# Patient Record
Sex: FEMALE | Race: WHITE | NOT HISPANIC OR LATINO | Employment: FULL TIME | URBAN - METROPOLITAN AREA
[De-identification: names, ages, dates, MRNs, and addresses within clinical notes are randomized per-mention and may not be internally consistent; named-entity substitution may affect disease eponyms.]

---

## 2017-02-13 ENCOUNTER — OFFICE VISIT - HEALTHEAST (OUTPATIENT)
Dept: FAMILY MEDICINE | Facility: CLINIC | Age: 22
End: 2017-02-13

## 2017-02-13 DIAGNOSIS — Z11.3 SCREEN FOR STD (SEXUALLY TRANSMITTED DISEASE): ICD-10-CM

## 2017-02-13 DIAGNOSIS — F41.9 ANXIETY: ICD-10-CM

## 2017-02-15 ENCOUNTER — AMBULATORY - HEALTHEAST (OUTPATIENT)
Dept: FAMILY MEDICINE | Facility: CLINIC | Age: 22
End: 2017-02-15

## 2017-02-23 ENCOUNTER — COMMUNICATION - HEALTHEAST (OUTPATIENT)
Dept: FAMILY MEDICINE | Facility: CLINIC | Age: 22
End: 2017-02-23

## 2017-09-04 ENCOUNTER — COMMUNICATION - HEALTHEAST (OUTPATIENT)
Dept: FAMILY MEDICINE | Facility: CLINIC | Age: 22
End: 2017-09-04

## 2017-09-06 ENCOUNTER — AMBULATORY - HEALTHEAST (OUTPATIENT)
Dept: FAMILY MEDICINE | Facility: CLINIC | Age: 22
End: 2017-09-06

## 2017-11-26 ENCOUNTER — HEALTH MAINTENANCE LETTER (OUTPATIENT)
Age: 22
End: 2017-11-26

## 2017-12-29 ENCOUNTER — RECORDS - HEALTHEAST (OUTPATIENT)
Dept: ADMINISTRATIVE | Facility: OTHER | Age: 22
End: 2017-12-29

## 2018-02-05 ENCOUNTER — OFFICE VISIT - HEALTHEAST (OUTPATIENT)
Dept: FAMILY MEDICINE | Facility: CLINIC | Age: 23
End: 2018-02-05

## 2018-02-05 DIAGNOSIS — Z00.00 ROUTINE HEALTH MAINTENANCE: ICD-10-CM

## 2018-02-05 DIAGNOSIS — F32.9 MAJOR DEPRESSIVE DISORDER WITH CURRENT ACTIVE EPISODE: ICD-10-CM

## 2018-02-05 DIAGNOSIS — Z86.19 HISTORY OF CHLAMYDIA: ICD-10-CM

## 2018-02-06 LAB
C TRACH DNA SPEC QL PROBE+SIG AMP: NEGATIVE
N GONORRHOEA DNA SPEC QL NAA+PROBE: NEGATIVE

## 2018-02-07 LAB

## 2018-03-12 ENCOUNTER — OFFICE VISIT - HEALTHEAST (OUTPATIENT)
Dept: FAMILY MEDICINE | Facility: CLINIC | Age: 23
End: 2018-03-12

## 2018-03-12 DIAGNOSIS — F32.9 MAJOR DEPRESSIVE DISORDER WITH CURRENT ACTIVE EPISODE: ICD-10-CM

## 2018-03-12 DIAGNOSIS — Z30.09 BIRTH CONTROL COUNSELING: ICD-10-CM

## 2018-03-12 LAB — HCG UR QL: POSITIVE

## 2018-03-13 ENCOUNTER — AMBULATORY - HEALTHEAST (OUTPATIENT)
Dept: FAMILY MEDICINE | Facility: CLINIC | Age: 23
End: 2018-03-13

## 2018-03-13 ENCOUNTER — OFFICE VISIT - HEALTHEAST (OUTPATIENT)
Dept: FAMILY MEDICINE | Facility: CLINIC | Age: 23
End: 2018-03-13

## 2018-03-13 DIAGNOSIS — Z34.90 PREGNANCY: ICD-10-CM

## 2018-03-13 DIAGNOSIS — O09.299 TRISOMY 18 IN CHILD OF PRIOR PREGNANCY, CURRENTLY PREGNANT: ICD-10-CM

## 2018-03-13 DIAGNOSIS — N91.2 AMENORRHEA: ICD-10-CM

## 2018-03-13 LAB
BASOPHILS # BLD AUTO: 0 THOU/UL (ref 0–0.2)
BASOPHILS NFR BLD AUTO: 1 % (ref 0–2)
EOSINOPHIL # BLD AUTO: 0.1 THOU/UL (ref 0–0.4)
EOSINOPHIL NFR BLD AUTO: 1 % (ref 0–6)
ERYTHROCYTE [DISTWIDTH] IN BLOOD BY AUTOMATED COUNT: 13 % (ref 11–14.5)
HCG SERPL-ACNC: 158 MLU/ML (ref 0–4)
HCT VFR BLD AUTO: 39.6 % (ref 35–47)
HGB BLD-MCNC: 13.6 G/DL (ref 12–16)
HIV 1+2 AB+HIV1 P24 AG SERPL QL IA: NEGATIVE
LYMPHOCYTES # BLD AUTO: 2.3 THOU/UL (ref 0.8–4.4)
LYMPHOCYTES NFR BLD AUTO: 36 % (ref 20–40)
MCH RBC QN AUTO: 32.3 PG (ref 27–34)
MCHC RBC AUTO-ENTMCNC: 34.3 G/DL (ref 32–36)
MCV RBC AUTO: 94 FL (ref 80–100)
MONOCYTES # BLD AUTO: 0.5 THOU/UL (ref 0–0.9)
MONOCYTES NFR BLD AUTO: 8 % (ref 2–10)
NEUTROPHILS # BLD AUTO: 3.4 THOU/UL (ref 2–7.7)
NEUTROPHILS NFR BLD AUTO: 54 % (ref 50–70)
PLATELET # BLD AUTO: 187 THOU/UL (ref 140–440)
PMV BLD AUTO: 9.8 FL (ref 8.5–12.5)
RBC # BLD AUTO: 4.21 MILL/UL (ref 3.8–5.4)
WBC: 6.3 THOU/UL (ref 4–11)

## 2018-03-14 ENCOUNTER — COMMUNICATION - HEALTHEAST (OUTPATIENT)
Dept: FAMILY MEDICINE | Facility: CLINIC | Age: 23
End: 2018-03-14

## 2018-03-14 LAB
ABO/RH(D): NORMAL
ABORH REPEAT: NORMAL
ANTIBODY SCREEN: NEGATIVE
HBV SURFACE AG SERPL QL IA: NEGATIVE
RUBV IGG SERPL QL IA: POSITIVE
T PALLIDUM AB SER QL: NEGATIVE

## 2018-03-28 ENCOUNTER — COMMUNICATION - HEALTHEAST (OUTPATIENT)
Dept: FAMILY MEDICINE | Facility: CLINIC | Age: 23
End: 2018-03-28

## 2018-03-28 DIAGNOSIS — Z34.90 PREGNANCY: ICD-10-CM

## 2018-03-29 ENCOUNTER — AMBULATORY - HEALTHEAST (OUTPATIENT)
Dept: FAMILY MEDICINE | Facility: CLINIC | Age: 23
End: 2018-03-29

## 2018-03-29 DIAGNOSIS — R53.83 LOW ENERGY: ICD-10-CM

## 2018-03-29 DIAGNOSIS — R53.83 FATIGUE: ICD-10-CM

## 2018-04-04 ENCOUNTER — COMMUNICATION - HEALTHEAST (OUTPATIENT)
Dept: FAMILY MEDICINE | Facility: CLINIC | Age: 23
End: 2018-04-04

## 2018-04-04 ENCOUNTER — HOSPITAL ENCOUNTER (OUTPATIENT)
Dept: ULTRASOUND IMAGING | Facility: HOSPITAL | Age: 23
Discharge: HOME | End: 2018-04-04
Attending: FAMILY MEDICINE

## 2018-04-04 DIAGNOSIS — Z34.90 PREGNANCY: ICD-10-CM

## 2018-04-09 ENCOUNTER — MEDICAL CORRESPONDENCE (OUTPATIENT)
Dept: HEALTH INFORMATION MANAGEMENT | Facility: CLINIC | Age: 23
End: 2018-04-09

## 2018-04-17 ENCOUNTER — TRANSFERRED RECORDS (OUTPATIENT)
Dept: HEALTH INFORMATION MANAGEMENT | Facility: CLINIC | Age: 23
End: 2018-04-17

## 2018-04-25 DIAGNOSIS — O09.299: Primary | ICD-10-CM

## 2018-05-04 ENCOUNTER — PRE VISIT (OUTPATIENT)
Dept: MATERNAL FETAL MEDICINE | Facility: CLINIC | Age: 23
End: 2018-05-04

## 2018-05-10 ENCOUNTER — RECORDS - HEALTHEAST (OUTPATIENT)
Dept: ADMINISTRATIVE | Facility: OTHER | Age: 23
End: 2018-05-10

## 2018-05-11 ENCOUNTER — RECORDS - HEALTHEAST (OUTPATIENT)
Dept: ADMINISTRATIVE | Facility: OTHER | Age: 23
End: 2018-05-11

## 2018-05-11 ENCOUNTER — HOSPITAL ENCOUNTER (OUTPATIENT)
Dept: LAB | Facility: CLINIC | Age: 23
End: 2018-05-11
Attending: OBSTETRICS & GYNECOLOGY
Payer: MEDICAID

## 2018-05-11 ENCOUNTER — OFFICE VISIT (OUTPATIENT)
Dept: MATERNAL FETAL MEDICINE | Facility: CLINIC | Age: 23
End: 2018-05-11
Attending: OBSTETRICS & GYNECOLOGY
Payer: MEDICAID

## 2018-05-11 ENCOUNTER — OFFICE VISIT (OUTPATIENT)
Dept: MATERNAL FETAL MEDICINE | Facility: CLINIC | Age: 23
End: 2018-05-11
Attending: FAMILY MEDICINE
Payer: MEDICAID

## 2018-05-11 ENCOUNTER — HOSPITAL ENCOUNTER (OUTPATIENT)
Dept: ULTRASOUND IMAGING | Facility: CLINIC | Age: 23
Discharge: HOME | End: 2018-05-11
Attending: OBSTETRICS & GYNECOLOGY | Admitting: OBSTETRICS & GYNECOLOGY
Payer: MEDICAID

## 2018-05-11 DIAGNOSIS — O26.90 PREGNANCY RELATED CONDITION, ANTEPARTUM: ICD-10-CM

## 2018-05-11 DIAGNOSIS — O09.291 PREVIOUS PREGNANCY COMPLICATED BY CHROMOSOMAL ABNORMALITY, ANTEPARTUM, FIRST TRIMESTER: Primary | ICD-10-CM

## 2018-05-11 DIAGNOSIS — O09.291 PREVIOUS PREGNANCY COMPLICATED BY CHROMOSOMAL ABNORMALITY, ANTEPARTUM, FIRST TRIMESTER: ICD-10-CM

## 2018-05-11 DIAGNOSIS — O09.299 PREVIOUS PREGNANCY COMPLICATED BY CHROMOSOMAL ABNORMALITY, ANTEPARTUM: Primary | ICD-10-CM

## 2018-05-11 DIAGNOSIS — O09.299: ICD-10-CM

## 2018-05-11 PROCEDURE — 40000791 ZZHCL STATISTIC VERIFI PRENATAL TRISOMY 21,18,13: Performed by: GENETIC COUNSELOR, MS

## 2018-05-11 PROCEDURE — 36415 COLL VENOUS BLD VENIPUNCTURE: CPT | Performed by: GENETIC COUNSELOR, MS

## 2018-05-11 PROCEDURE — 76813 OB US NUCHAL MEAS 1 GEST: CPT

## 2018-05-11 PROCEDURE — 96040 ZZH GENETIC COUNSELING, EACH 30 MINUTES: CPT | Mod: ZF | Performed by: GENETIC COUNSELOR, MS

## 2018-05-11 NOTE — MR AVS SNAPSHOT
After Visit Summary   5/11/2018    Hannah Severino    MRN: [id number]           Patient Information     Date Of Birth          1995        Visit Information        Provider Department      5/11/2018 2:45 PM Carl Guzman MD [facility] Maternal Fetal Medicine [facility]        Today's Diagnoses     Previous pregnancy complicated by chromosomal abnormality, antepartum    -  1       Follow-ups after your visit        Your next 10 appointments already scheduled     Jun 22, 2018  1:30 PM CDT   M US COMP with [id number]   [facility] Fetal Medicine Ultrasound - [facility])    [address]  [facility] [address] [phone]           Wear comfortable clothes and leave your valuables at home.            Jun 22, 2018  2:00 PM CDT   Radiology MD with  URSULA SUNG   [facility] Maternal Fetal Medicine [facility] ([facility])    [address]  [facility] [address] [phone]           Please arrive at the time given for your first appointment. This visit is used internally to schedule the physician's time during your ultrasound.              Future tests that were ordered for you today     Open Future Orders        Priority Expected Expires Ordered    MFM US Comprehensive Single Routine 6/22/2018 3/11/2019 5/11/2018    Non Invasive Prenatal Test Cell Free DNA Routine  8/9/2018 5/11/2018    Maternal Fetal Nuchal Translucency Routine  2/25/2019 4/25/2018            Who to contact     If you have questions or need follow up information about today's clinic visit or your schedule please contact [facility] directly at [phone].  Normal or non-critical lab and imaging results will be communicated to you by MyChart, letter or phone within 4 business days after the clinic has received the results. If you do not hear from us within 7 days, please contact the clinic through MyChart or phone. If you  "have a critical or abnormal lab result, we will notify you by phone as soon as possible.  Submit refill requests through [employer] or call your pharmacy and they will forward the refill request to us. Please allow 3 business days for your refill to be completed.          Additional Information About Your Visit        [employer] Information     [employer] lets you send messages to your doctor, view your test results, renew your prescriptions, schedule appointments and more. To sign up, go to [email or web address] . Click on \"Log in\" on the left side of the screen, which will take you to the Welcome page. Then click on \"Sign up Now\" on the right side of the page.     You will be asked to enter the access code listed below, as well as some personal information. Please follow the directions to create your username and password.     Your access code is: [email or web address]  Expires: 2018  3:50 PM     Your access code will  in 90 days. If you need help or a new code, please call your [place] clinic or [phone].        Care EveryWhere ID     This is your Care EveryWhere ID. This could be used by other organizations to access your [place] medical records  [id number]        Your Vitals Were     Last Period                   2018            Blood Pressure from Last 3 Encounters:   16 (!) 82/60   16 101/74   16 95/65    Weight from Last 3 Encounters:   16 49.9 kg (110 lb)   16 54 kg (119 lb)   16 54.3 kg (119 lb 12.8 oz)               Primary Care Provider Office Phone # Fax #    Doreen MARADIAGA [place] [phone]       [facility] [address] [address]        Equal Access to Services     NGUYEN CLIFTON : Macario gallardoo Soely, waaxda luqadaha, qaybta kaalmada gabriella, jesus vines. So [facility] [phone].    ATENCIÓN: Si habla español, tiene a king disposición servicios gratuitos de asistencia " lingüística. Ayden al [phone].    We comply with applicable federal civil rights laws and [place] laws. We do not discriminate on the basis of race, color, national origin, age, disability, sex, sexual orientation, or gender identity.            Thank you!     Thank you for choosing [facility]  for your care. Our goal is always to provide you with excellent care. Hearing back from our patients is one way we can continue to improve our services. Please take a few minutes to complete the written survey that you may receive in the mail after your visit with us. Thank you!             Your Updated Medication List - Protect others around you: Learn how to safely use, store and throw away your medicines at www.disposemymeds.org.          This list is accurate as of 5/11/18  3:50 PM.  Always use your most recent med list.                   Brand Name Dispense Instructions for use Diagnosis    sertraline 20 MG/ML (HIGH CONC) solution    ZOLOFT    75 mL    Take 2.5 mLs (50 mg) by mouth daily    Adjustment disorder with mixed disturbance of emotions and conduct

## 2018-05-11 NOTE — PROGRESS NOTES
"Please see \"Imaging\" tab under \"Chart Review\" for details of today's visit.    Carl Guzman    "

## 2018-05-11 NOTE — MR AVS SNAPSHOT
After Visit Summary   5/11/2018    Hannah Severino    MRN: [id number]           Patient Information     Date Of Birth          1995        Visit Information        Provider Department      5/11/2018 1:30 PM Cassandra Woo, MIKA [facility] Maternal Fetal Medicine [facility]        Today's Diagnoses     Previous pregnancy complicated by chromosomal abnormality, antepartum, first trimester    -  1    Pregnancy related condition, antepartum           Follow-ups after your visit        Your next 10 appointments already scheduled     Jun 22, 2018  1:30 PM CDT   [facility] US COMP with [id number]   [facility] Maternal Fetal Medicine Ultrasound - [facility] ([facility])    [address]  [facility] [address] [phone]           Wear comfortable clothes and leave your valuables at home.            Jun 22, 2018  2:00 PM CDT   Radiology MD with  MFM MD   [facility] Maternal Fetal Medicine [facility] ([facility])    [address]  [facility] [address] [phone]           Please arrive at the time given for your first appointment. This visit is used internally to schedule the physician's time during your ultrasound.              Future tests that were ordered for you today     Open Future Orders        Priority Expected Expires Ordered    MFM US Comprehensive Single Routine 6/22/2018 3/11/2019 5/11/2018    Non Invasive Prenatal Test Cell Free DNA Routine  8/9/2018 5/11/2018    Maternal Fetal Nuchal Translucency Routine  2/25/2019 4/25/2018            Who to contact     If you have questions or need follow up information about today's clinic visit or your schedule please contact [facility] directly at [phone].  Normal or non-critical lab and imaging results will be communicated to you by MyChart, letter or phone within 4 business days after the clinic has received the results. If you do not hear from us within 7  "days, please contact the clinic through [employer] or phone. If you have a critical or abnormal lab result, we will notify you by phone as soon as possible.  Submit refill requests through [employer] or call your pharmacy and they will forward the refill request to us. Please allow 3 business days for your refill to be completed.          Additional Information About Your Visit        [employer] Information     [employer] lets you send messages to your doctor, view your test results, renew your prescriptions, schedule appointments and more. To sign up, go to [email or web address] . Click on \"Log in\" on the left side of the screen, which will take you to the Welcome page. Then click on \"Sign up Now\" on the right side of the page.     You will be asked to enter the access code listed below, as well as some personal information. Please follow the directions to create your username and password.     Your access code is: [email or web address]  Expires: 2018  3:50 PM     Your access code will  in 90 days. If you need help or a new code, please call your [place] clinic or [phone].        Care EveryWhere ID     This is your Care EveryWhere ID. This could be used by other organizations to access your [place] medical records  [id number]        Your Vitals Were     Last Period                   2018            Blood Pressure from Last 3 Encounters:   16 (!) 82/60   16 101/74   16 95/65    Weight from Last 3 Encounters:   16 49.9 kg (110 lb)   16 54 kg (119 lb)   16 54.3 kg (119 lb 12.8 oz)              We Performed the Following     [facility] Genetic Counseling        Primary Care Provider Office Phone # Fax #    Doreen Osborne [phone]       [facility] [address] [facility] [address]  [facility] [address]        Equal Access to Services     YANE CLIFTON AH: Macario gallardoo Somadayali, waaxda luqadaha, qaybta kaalmonse quiroz, jesus andrade " laparamjit vines. So [facility] [phone].    ATENCIÓN: Si habla edmundo, tiene a king disposición servicios gratuitos de asistencia lingüística. Ayden al [phone].    We comply with applicable federal civil rights laws and [place] laws. We do not discriminate on the basis of race, color, national origin, age, disability, sex, sexual orientation, or gender identity.            Thank you!     Thank you for choosing [facility]  for your care. Our goal is always to provide you with excellent care. Hearing back from our patients is one way we can continue to improve our services. Please take a few minutes to complete the written survey that you may receive in the mail after your visit with us. Thank you!             Your Updated Medication List - Protect others around you: Learn how to safely use, store and throw away your medicines at www.disposemymeds.org.          This list is accurate as of 5/11/18 11:59 PM.  Always use your most recent med list.                   Brand Name Dispense Instructions for use Diagnosis    sertraline 20 MG/ML (HIGH CONC) solution    ZOLOFT    75 mL    Take 2.5 mLs (50 mg) by mouth daily    Adjustment disorder with mixed disturbance of emotions and conduct

## 2018-05-12 NOTE — PROGRESS NOTES
"[facility] [place]  Genetic Counseling Consult    Patient: Hannah Severino YOB: 1995   Date of Service: May 10, 2018 Referring Provider:  Dr. Doreen Osborne     Hannah Severino was seen at [facility] [place] for genetic consultation to discuss the options for screening and testing for fetal chromosome abnormalities in this pregnancy because of her history of two previous pregnancies with Trisomy 18 (47,XX,+18).  She was accompanied to clinic today by a friend.       Summary/Plan:     1.  Hannah has had two previous pregnancies with Trisomy 18 (47,XX,+18): a baby that was stillborn at 38 weeks and a first trimester pregnancy loss.  We talked about that this history likely increases the risk for Trisomy 18 (and possibly other chromosome problems) in this pregnancy, but it is hard to be specific about an exact risk.  Regardless, we talked about that Hannah should be offered \"increased risk\" screening and testing options for fetal chromosome abnormalities in this pregnancy because of this history.  See below for more details of that discussion.    2.  After our discussion, Hannah decided to have an ultrasound for nuchal translucency and nasal bone assessment and her blood draw for an NIPT analysis (Innatal test through [facility]).  Results are expected within 7-10 days and will be available in [facility].  We will contact her to discuss the results, and a copy will be forwarded to the office of the referring prenatal care provider.    3. Maternal serum AFP only (single marker screen) is recommended to be considered after 15 weeks to screen for open neural tube defects. A quad screen is not recommended to be performed.    4. Hannah reports that she is not interested in invasive prenatal testing at this time.  An 18-20 week comprehensive ultrasound is generally considered standard of care for all women who are at increased risk for a fetal " "chromosome problem and who decline invasive testing for chromosome problems during pregnancy.  This has been scheduling in our clinic for 18.    Pregnancy History:   /Parity:   (1 stillbirth, 1 SAB) Age at Delivery: 23 year old  ALF: 2018, by ultrasound   Gestational Age: 12w3d    Hannah reports that she is not currently taking her prenatal vitamins because of pregnancy-related nausea.       Hannah also reports that she is currently not taking the sertraline that she usually takes for depression/anxiety because of concerns about how this might affect the baby.  She reports that she notices an increase in her mental health-related symptoms, particularly with mood swings, but she feels like she is \"okay\" not taking her medication.    We talked about the importance of good mental health during pregnancy and the post-partum time period.  We talked about that it is important for her to monitor her mental health symptoms and to contact her psychiatrist/health care providers if she experiences significant changes in her symptoms.  In particular, we talked about that it would be important for her to let her providers know if she is 1) having thoughts of harming herself or others, or 2) is unable to function/perform her routine daily activities, or 3) is having trouble sleeping or eating.        Hannah reports that she recently quit cigarette smoking.  She reports that she quit drinking alcohol at the time of identifying her pregnancy around 6 weeks past her LMP.        Per her prenatal record, Hannah ALF was determined by a 7 week ultrasound.  This ultrasound showed fetal growth consistent with her LMP date.      See family history section below for details of her previous pregnancies.  Hannah reports that this pregnancy was conceived with the same partner as her second pregnancy; however, her prenatal record notes that Hannah indicated that she was not sure about the father of this pregnancy.  Hannah " does report that her first pregnancy occurred with a different partner than this pregnancy.      Hannah talked a bit today about some of her emotions of this pregnancy, given the context of two previous pregnancies losses.  She talked about that she finds herself feeling cautious about the pregnancy and that this makes it challenging when others in her life are expressing more excitement about the pregnancy.  We talked about that these emotions and feelings are common to many women who have had similar experiences with previous pregnancy losses.       Family History:     A four-generation pedigree was obtained and will be scanned under the  Media  tab in EPIC. The following significant findings were reported by Hannah:      Previous pregnancies with Trisomy 18:  Review of records available through [facility] note that with her first pregnancy in , Hannah presented to [facility] for a first trimester screen.  Ultrasound assessment at that time identified a cystic hygroma; therefore, she chose to have an NIPT analysis drawn.  This NIPT analysis was screen positive for Trisomy 18.  A second trimester ultrasound showed a fetal heart defect, but Hannah declined invasive prenatal testing.  Subsequent ultrasounds showed additional findings consistent with Trisomy 18 (besides the complex heart defect), including a strawberry-shaped calvarium, absent CSP, right choroid plexus cyst, abnormal hand posturing, rocker-bottom feet, and intrauterine growth retardation.      Hannah decided to have an amniocentesis at 26 weeks, which confirmed a diagnosis of Trisomy 18 caused by a non-disjunction event:  47,XX,+18.  Hannah originally considered an early induction of labor, but ultimately decided to continue the pregnancy.  Hannah reports that she had an intrauterine fetal demise diagnosed at 37 and a half weeks and subsequently delivered her daughter at [facility].  She reports that she named her  "daughter Eduardo Young reports that her second pregnancy was with a different partner than her first pregnancy.  With that pregnancy, records indicate that she had a normal 7 week ultrasound.  However, an ultrasound done at about 9 weeks showed no fetal heart rate, and the fetus measured around 8 week size.  Hannah had a D&C at that time, and her provider requested some evaluations for \"recurrent pregnancy loss.\"  Records in [facility] showed that this testing included a normal blood chromosome analysis for Hannah (46,XX), a negative lupus anticoagulant test, a negative cardiolipin antibody screen (IgM and IgG), and a negative beta-2-glycoprotein antibody screen (IgM and IgG).  A chromosome analysis was also requested from the products of conception and showed Trisomy 18 caused by a non-disjunction event: 47,XX,+18.    We talked about that this history of recurrent pregnancies with Trisomy 18 in a young mother is unusual.  While having a previous pregnancy with a non-disjunction related chromosome problem is thought to somewhat raise the risk of a non-disjunction related chromosome problem in a future pregnancy, the overall risk is usually still low.  However, we talked about that having two pregnancies with Trisomy 18 at a young age raises the suspicion for a more significantly increased risk for fetal chromosome anomalities for Hannah's pregnancies.    We talked about that there could be variety of potential explanations for a young woman to have two consecutive pregnancies with Trisomy 18, and that we can't sure of what the explanation is for her:    1) We talked about that one potential explanation is just random \"bad luck.\"      2) Additionally, there are some reports of in the scientific literature of some women or families that seem to be \"predisposed\" to have pregnancies with non-disjunction related chromosome problems.  The reasons for this are not often known, but it is thought that these " "families/individuals might have variations in proteins involved in chromosome separation during meiosis.    3) We also talked about that there have been reports of women who have recurrent pregnancies with Trisomy 18 because they, themselves, are mosaic for Trisomy 18 (meaning that a proportion of their body cells and/or germ cells have three chromosome 18's).  Hannah did have a previous normal chromosome analysis, but we talked about that a normal blood chromosome analysis would not rule out the possibility of mosaicism in other cells in her body.  (Of note, her blood chromosome analysis only analyzed 20 cells, and therefore, does also not rule out mosaicism in her blood, either.)    We talked about that because we don't know the exact explanation for her two previous pregnancies with non-disjunction related Trisomy 18, it is hard to be specific about exact risks for a fetal chromosome problem in this pregnancy.  However, we talked about that this history likely significantly increases the risk of Trisomy 18 (and possibly other chromosome problems) in this pregnancy.  Because of this, we talked about that it makes sense to offer Hannah the screening and testing options for fetal chromosome problems generally offered to women of \"increased risk\" to have a pregnancy with a chromosome problem.      Family history of infant death:  Hannah reports that her father had a brother that  at around age 1 from \"swelling in the brain.\"  She didn't know a lot of details about this or the reason for this brain swelling.  It is hard to be specific about risks related to this history without more details about this diagnosis.  She was not aware of a specific genetic condition in this individual, nor did she know of any other family history of similarly affected individuals.      Family history of other stillbirth:  Hannah reports that the father of this baby had a previous pregnancy with another partner that ended in a " "stillbirth at 25 weeks.  She reports that she doesn't know any other detail about this loss, because he \"doesn't like to talk about this.\"  It is hard to be specific about risks related to this history without more details.  She does report that her current partner also has two other ongoing pregnancies with two other partners currently.    Otherwise, the reported family history is negative for multiple miscarriages, intellectual disabilities, and consanguinity.       Carrier Screening:       Hannah reports that she is of  ancestry.  We briefly talked about cystic fibrosis is an autosomal recessive genetic condition that occurs with increased frequency in individuals of  ancestry and carrier screening for this condition is available.  In addition,  screening in the [facility] includes cystic fibrosis.      Hannah reports that the father of this pregnancy is .  We briefly talked about the hemoglobinopathies are a group of genetic blood diseases that occur with increased frequency in individuals of  ancestry and carrier screening for these conditions is available.  Carrier screening for the hemoglobinopathies includes a CBC with red blood cell indices, a ferritin level, and a quantitative hemoglobin electrophoresis or HPLC.  In addition,  screening in the [facility] includes many of the hemoglobinopathies.    In addition to ethnic-based carrier screening, we talked about that expanded carrier screening for mutations in a large panel of genes associated with autosomal recessive conditions (including cystic fibrosis, spinal muscular atrophy, and others) and X-linked recessive conditions (like Fragile X syndrome) is now available      If  Hannah decides that she would like to have cystic fibrosis or other carrier screening in the future, I would be happy to help facilitate this, if needed.       Testing Options:       We reviewed the benefits and " limitations of screening and diagnostic tests:    - We talked about that screening tests provide a risk assessment specific to the pregnancy for certain fetal chromosome abnormalities, but cannot definitively diagnose or exclude a fetal chromosome abnormality. Follow-up genetic counseling and consideration of diagnostic testing is recommended with any abnormal screening result.     - We discussed that dagnostic tests are associated with a risk of miscarriage. These tests can detect fetal chromosome abnormalities with greater than 99% certainty. Results can rarely be complicated by maternal cell contamination or mosaicism and are limited by the resolution of cytogenetic G-banding technology.     -There is no screening nor diagnostic test that can detect all forms of birth defects or mental disability.      We discussed the following screening and testing options:     Non-invasive Prenatal Testing (NIPT)  - This test involves measurement of cell-free DNA testing, which is of both maternal and placental/fetal origin.  - First trimester ultrasound with nuchal translucency and nasal bone assessment is recommended to be performed along with NIPT, when appropriate.  - This test screens for fetal trisomy 21, trisomy 13, trisomy 18, and sex chromosome aneuploidy.  - While this test is thought to be highly specific/sensitive with respect to screening for trisomy 21, trisomy 13, and trisomy 18, rare false positives and false negatives do occur. There is still limited data about the exact sensitivity/specificity of this test with respect to screening for sex chromosome aneuploidy.  - Insurance coverage for this test is variable, and so patients are encouraged to contact their insurance companies if there are questions about coverage for this test.  -  This test cannot screen for open neural tube defects, and so consideration of maternal serum AFP 15 weeks or later is recommended.     Chorionic villus sampling (CVS)  - This  invasive procedure is typically performed between 10 and 13 weeks of pregnancy, through which placental villi are obtained for the purpose of chromosome analysis and/or other prenatal genetic analysis.  - CVS is considered a diagnostic test for chromosome problems during pregnancy. Maternal cell contamination studies are performed, when indicated.  -The risk of pregnancy loss associated with a CVS procedure is generally estimated to be 1/200 or less.  -This test cannot screen for open neural tube defects, and so consideration of maternal serum AFP 15 weeks or later is recommended.     Genetic Amniocentesis  - This is an invasive procedure typically performed at 15 weeks or later, through which amniotic fluid is obtained for the purpose of chromosome analysis and/or other prenatal genetic analysis.  - Amniocentesis is considered a diagnostic test for chromosome problems during pregnancy.  - The risk of pregnancy loss associated with amniocentesis is generally estimated to be 1/500 or less.  - Amniotic fluid AFP measurement is also done to screen for the possibility of open neural tube or ventral defects.     Comprehensive (Level II) ultrasound  - This detailed ultrasound is usually performed between 18-22 weeks gestation to screen for major birth defects.  - It also screens for ultrasound markers that might increase the risk for a chromosome problem in a pregnancy.     Face-to-face time of the genetic counseling appointment was 35 minutes.    Jody Woo MS, [facility]  Genetic Counselor  Ph: [phone]  Pager: [phone]

## 2018-05-17 ENCOUNTER — TELEPHONE (OUTPATIENT)
Dept: MATERNAL FETAL MEDICINE | Facility: CLINIC | Age: 23
End: 2018-05-17

## 2018-05-17 ENCOUNTER — RECORDS - HEALTHEAST (OUTPATIENT)
Dept: ADMINISTRATIVE | Facility: OTHER | Age: 23
End: 2018-05-17

## 2018-05-17 NOTE — TELEPHONE ENCOUNTER
"I had seen Hannah for genetic counseling last week regarding her history of two previous pregnancies with Trisomy 18 (47,XX,+18).  At that time, I had reviewed with Hannah that one of the possible explanations for recurrent pregnancies with the same non-disjunction event in a young woman is either somatic or germline mosaicism in that woman.  Hannah had a normal ultrasound at that time and also had her blood drawn for NIPT analysis at that time.  (Please see previous genetic counseling notes for more details.)    Unfortunately, I received back Hannah's NIPT results today, which are SCREEN POSITIVE for Trisomy 18.  I called Hannah this afternoon and talked with her about these results.  We talked about that given these NIPT results and her previous history, I would expect the risk of Trisomy 18 in this pregnancy to be significantly increased; however, this risk would still not be 100%.     In particular, we talked about that if Hannah has somatic mosaicism for Trisomy 18 herself, it is possible that this may interfere with this NIPT testing technology and lead to a positive result, even if the fetus is not affected with Trisomy 18. (This is because the NIPT analysis involves quantification of all the cell-free DNA in the maternal blood stream, some of which is maternal in origin and some of which is placental in origin.)  Hannah talked about again that her previous blood testing in herself was normal (46,XX), and we reviewed again about that her blood test only looked at 20 cells in her blood, and therefore, does NOT rule-out the possibility of mosaicism for Trisomy 18 in her body (either somatic or germline).      We talked about that because this possibility of somatic mosaicism in Hannah could lead to \"false positive\" results, it would be important for Hannah to have invasive prenatal testing to confirm a fetal diagnosis of Trisomy 18 prior to making decisions about continuing or ending the pregnancy.  We reviewed " "again the options of CVS and amniocentesis.  We talked about that I was not sure if CVS would be an option for her, since she is already 13 weeks 2 days, and there are only a few physicians in the state that perform CVS during the 13th week.  I asked Hannah if she wanted me to call those offices to see if it would be possible to get her in for a CVS tomorrow or Monday, but she declines this at this time.  Hannah reports being familiar with amniocentesis from her first pregnancy, and she reported again feeling anxious about amniocentesis because she recalls her previous amniocentesis to be very painful.  We talked about that she could also consider continuing the pregnancy and getting more information about the baby via ultrasound, in order to see if there are any additional concerns for Trisomy 18 on those findings, in case that would aid in her decision-making.    Hannah did say today that she \"didn't think she could go through another pregnancy with Trisomy 18,\" and so she states that she would consider pregnancy termination.  I again encouraged her to have an invasive prenatal testing to confirm a diagnosis of Trisomy 18 in this pregnancy prior to having a termination.  Hannah stated that right now she wasn't sure what she wanted to do next.  She stated that she wanted to talk first some more with her primary prenatal care provider (Dr. Osborne) before making choices about next steps in her testing plan.  I told Hannah I would call Dr. Osborne to tell her about these results and our conversations.    Hannah was understandably disappointed about these test results.  She told me that she is concerned that she won't ever be able to have a normal pregnancy.  She asked if repeating the NIPT analysis would be helpful, and I stated I did not think that would provide additional useful information at that time.  We did talk about that she could consider a repeat blood chromosome analysis looking at larger numbers of " cells in her blood to better evaluate for somatic mosaicism; however, we talked about that a normal result still wouldn't rule out somatic mosaicism, and while a positive result might confirm mosaicism, it still would be difficult to provide specific recurrence risk information.    Hannah confirmed that she still has my contact information and was going to re-contact me if she decides she wants additional testing and wants help coordinating this.  I encouraged her to contact me if she thinks of additional questions.    Jody Woo MS, [facility]  Genetic Counselor  Ph: [phone]  Pager: [phone]    Addendum:  I did reach Dr. Osborne over the phone to discuss with her these test results.  A copy of this note and results will be faxed to her. Dr. Osborne noted an error in Hannah's ultrasound report that stated that Hannah had a previous normal NIPT analysis; I will have the M correct this report.

## 2018-05-18 ENCOUNTER — DOCUMENTATION ONLY (OUTPATIENT)
Dept: MATERNAL FETAL MEDICINE | Facility: CLINIC | Age: 23
End: 2018-05-18

## 2018-05-18 LAB — LAB SCANNED RESULT: ABNORMAL

## 2018-05-18 NOTE — PROGRESS NOTES
Yesterday, it was noted that Hannah's original NIPT report listed an incorrect birth date of [birth year].  Prior to calling Hannah with her results, I did contact [facility] and asked them to double check the identification for this sample/report, and they reported that the other identifiers available matched.  (It appears that someone at [facility] had transposed the draw date with her birth year.)  [facility] was going to update and send out a revised report to our clinic and the laboratory send outs.    I received the revised report, and this updated report was faxed to Hannah's primary prenatal care provider's office.    Jody Woo MS, [facility]  Genetic Counselor  Ph: [phone]  Pager: [phone]

## 2018-05-22 ENCOUNTER — TELEPHONE (OUTPATIENT)
Dept: MATERNAL FETAL MEDICINE | Facility: CLINIC | Age: 23
End: 2018-05-22

## 2018-05-22 NOTE — TELEPHONE ENCOUNTER
I received a voicemail message from Dr. Osborne (Hannah's primary prenatal care provider) stating that she had spoke with Hannah and that Hannah has decided that she wants to schedule invasive prenatal testing, in order to diagnostic information about the possibility of a chromosome problem in this pregnancy.    I called and left a voicemail for Hannah this morning, asking her to contact me, so I could help facilitate this.    Jody Woo MS, [facility]  Genetic Counselor  Ph: [phone]  Pager: [phone]

## 2018-05-25 ENCOUNTER — TELEPHONE (OUTPATIENT)
Dept: MATERNAL FETAL MEDICINE | Facility: CLINIC | Age: 23
End: 2018-05-25

## 2018-05-25 DIAGNOSIS — O28.0 ABNORMAL MATERNAL SERUM SCREENING TEST: Primary | ICD-10-CM

## 2018-05-25 NOTE — TELEPHONE ENCOUNTER
Hannah left me a voicemail yesterday afternoon, in response to my voicemail to her earlier in the week.  I tried her back, but I got her voicemail again, and so I left her another message to call me.    Jody Woo MS, [facility]  Genetic Counselor  Ph: [phone]  Pager: [phone]

## 2018-05-25 NOTE — TELEPHONE ENCOUNTER
"Hannah returned my earlier voicemail message.  She reports that she is interested in having an amniocentesis done, as she understands that it would be important to have diagnostic information about a potential chromosome problem in this pregnancy before proceeding with a pregnancy termination.  Hannah reports feeling anxious about an amniocentesis, because she recalls the amniocentesis experience with her first pregnancy as being very unpleasant.  Hannah previously reported that she found the procedure to be painful; today, she talked about that she felt like the physician who did her amniocentesis was \"cold and unfeeling\" and \"just told her that the baby was going to die\" without empathy or further discussion.    We talked about our general process for amniocentesis here.  We talked about that she would first meet with a genetic counselor to review consent forms for the procedure and testing.  We talked about that she would then have an ultrasound and that the maternal-fetal medicine physician would meet with her to discuss the ultrasound first, and then if Hannah felt comfortable with the situation, she could then proceed with an amniocentesis.  Hannah stated that she felt like this would be an acceptable plan for her.  We talked about that our physicians will attempt amniocentesis as early as 15 weeks but that sometimes at 15 weeks the amnion and chorion membranes are noted on ultrasound to not be fused, and then the amniocentesis can't be done.  We talked about that for most pregnancies, the amnion and chorion membranes are fused by 16 weeks.    Hannah stated that she \"wasn't in a hurry\" and would prefer to have her amniocentesis scheduled at 16 weeks given the above information.  She also prefers the [facility] location.  Therefore, she was scheduled for her amniocentesis on Friday, June 8, 2018.  Hannah reports no additional questions at this time.    Jody Woo MS, [facility]  Genetic Counselor  Ph: " [phone]  Pager: [phone]    Cc: Dr. Doreen Osborne  Fax:  [phone]

## 2018-05-31 ENCOUNTER — COMMUNICATION - HEALTHEAST (OUTPATIENT)
Dept: FAMILY MEDICINE | Facility: CLINIC | Age: 23
End: 2018-05-31

## 2018-06-08 ENCOUNTER — OFFICE VISIT (OUTPATIENT)
Dept: MATERNAL FETAL MEDICINE | Facility: CLINIC | Age: 23
End: 2018-06-08
Attending: OBSTETRICS & GYNECOLOGY
Payer: MEDICAID

## 2018-06-08 ENCOUNTER — RECORDS - HEALTHEAST (OUTPATIENT)
Dept: ADMINISTRATIVE | Facility: OTHER | Age: 23
End: 2018-06-08

## 2018-06-08 ENCOUNTER — HOSPITAL ENCOUNTER (OUTPATIENT)
Dept: ULTRASOUND IMAGING | Facility: CLINIC | Age: 23
Discharge: HOME | End: 2018-06-08
Attending: OBSTETRICS & GYNECOLOGY | Admitting: OBSTETRICS & GYNECOLOGY
Payer: MEDICAID

## 2018-06-08 DIAGNOSIS — O35.2XX0 HEREDITARY FAMILIAL DISEASE AFFECTING MANAGEMENT OF MOTHER AND POSSIBLY AFFECTING FETUS, ANTEPARTUM, SINGLE OR UNSPECIFIED FETUS: ICD-10-CM

## 2018-06-08 DIAGNOSIS — O28.0 ABNORMAL MATERNAL SERUM SCREENING TEST: Primary | ICD-10-CM

## 2018-06-08 DIAGNOSIS — O35.9XX0 FETAL ABNORMALITY AFFECTING MANAGEMENT OF MOTHER, ANTEPARTUM, SINGLE OR UNSPECIFIED FETUS: Primary | ICD-10-CM

## 2018-06-08 DIAGNOSIS — O28.0 ABNORMAL MATERNAL SERUM SCREENING TEST: ICD-10-CM

## 2018-06-08 DIAGNOSIS — O09.292 PREVIOUS PREGNANCY COMPLICATED BY CHROMOSOMAL ABNORMALITY IN SECOND TRIMESTER, ANTEPARTUM: ICD-10-CM

## 2018-06-08 PROCEDURE — 76805 OB US >/= 14 WKS SNGL FETUS: CPT

## 2018-06-08 PROCEDURE — 96040 ZZH GENETIC COUNSELING, EACH 30 MINUTES: CPT | Mod: ZF | Performed by: GENETIC COUNSELOR, MS

## 2018-06-08 NOTE — MR AVS SNAPSHOT
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            Jun 22, 2018  2:00 PM CDT   Radiology MD with  URSULA SUNG   [facility] Medicine [facility] ([facility])    [address]  [facility] [address] [phone]           Please arrive at the time given for your first appointment. This visit is used internally to schedule the physician's time during your ultrasound.              Future tests that were ordered for you today     Open Future Orders        Priority Expected Expires Ordered    AFP Amniotic Reflex to Acetylchol STAT  9/6/2018 6/8/2018    CHROMOSOME AMNIOTIC FLUID With Professional Interpretation STAT  9/6/2018 6/8/2018    FISH ANEUSCREEN With Professional Interpretation STAT  9/6/2018 6/8/2018            Who to contact     If you have questions or need follow up information about today's clinic visit or your schedule please contact [facility] FETAL [facility] directly at [phone].  Normal or non-critical lab and imaging results will be communicated to you by MyChart, letter or  "phone within 4 business days after the clinic has received the results. If you do not hear from us within 7 days, please contact the clinic through Zazoo or phone. If you have a critical or abnormal lab result, we will notify you by phone as soon as possible.  Submit refill requests through Zazoo or call your pharmacy and they will forward the refill request to us. Please allow 3 business days for your refill to be completed.          Additional Information About Your Visit        Zazoo Information     [employer] lets you send messages to your doctor, view your test results, renew your prescriptions, schedule appointments and more. To sign up, go to [email or web address] . Click on \"Log in\" on the left side of the screen, which will take you to the Welcome page. Then click on \"Sign up Now\" on the right side of the page.     You will be asked to enter the access code listed below, as well as some personal information. Please follow the directions to create your username and password.     Your access code is: [email or web address]  Expires: 2018  3:50 PM     Your access code will  in 90 days. If you need help or a new code, please call your [place] clinic or [phone].        Care EveryWhere ID     This is your Care EveryWhere ID. This could be used by other organizations to access your [place] medical records  [id number]        Your Vitals Were     Last Period                   2018            Blood Pressure from Last 3 Encounters:   16 (!) 82/60   16 101/74   16 95/65    Weight from Last 3 Encounters:   16 49.9 kg (110 lb)   16 54 kg (119 lb)   16 54.3 kg (119 lb 12.8 oz)              We Performed the Following     OBTAIN AFFIRMATION OF INFORMED CONSENT        Primary Care Provider Office Phone # Fax #    Doreen Osborne [phone]       [facility] [address]  [facility] [address]        Equal Access to Services     " YANE CLIFTON : Hadii aad fabiola tristen Flores, wajenniferda luqadaha, qaybta kaalmada saundradavidalex, waxshyam naresh adamsrandyconchita cantu . So [facility] [phone].    ATENCIÓN: Si habla español, tiene a king disposición servicios gratuitos de asistencia lingüística. Llame al [phone].    We comply with applicable federal civil rights laws and [place] laws. We do not discriminate on the basis of race, color, national origin, age, disability, sex, sexual orientation, or gender identity.            Thank you!     Thank you for choosing [facility]  for your care. Our goal is always to provide you with excellent care. Hearing back from our patients is one way we can continue to improve our services. Please take a few minutes to complete the written survey that you may receive in the mail after your visit with us. Thank you!             Your Updated Medication List - Protect others around you: Learn how to safely use, store and throw away your medicines at www.disposemymeds.org.          This list is accurate as of 6/8/18  4:25 PM.  Always use your most recent med list.                   Brand Name Dispense Instructions for use Diagnosis    sertraline 20 MG/ML (HIGH CONC) solution    ZOLOFT    75 mL    Take 2.5 mLs (50 mg) by mouth daily    Adjustment disorder with mixed disturbance of emotions and conduct

## 2018-06-08 NOTE — PROGRESS NOTES
Please see full imaging report from ViewPoint program under imaging tab.    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 I reviewed the patient's screening results.   She had cell-free DNA screening showing an increased risk of trisomy 18.    I discussed the findings on today's ultrasound with the patient and her mother. She also had an extensive counseling session with our genetic counselor Jody as well.  . I reviewed the limitations of ultrasound. We discussed the availability of amniocentesis for the precise diagnosis of chromosomal abnormalities including the associated procedure-related risk of pregnancy loss of approximately 1/400. If the fetus has trisomy 18 she would not wish to continue the pregnancy. But she is conflicted today as the amnio looks normal. Her first pregnancy had a large cystic hygroma followed by IUGR, and rocker bottom feet and clenched hands as well as a cardiac abnormality.     The patient initially planned amniocentesis today but had some concerns about earlier amnio but balanced those with fears that if she waited too long for the amnio to be done than termination of pregnancy might no longer be an option. We reviewed termination of pregnancy including D and E and palliative induction, which she would prefer not to have. If she goes forward with D and E for confirmed fetal trisomy 18 at a later date, she is very much interested in fetal intracardiac injection to stop cardiac activity prior to proceeding with D and E and this would be an option for her.     Ultimately she decided that she will return for her comprehensive anatomic survey in 2 weeks and will make a decision to likely proceed with amnio at that time. We have also discussed arranging a skin biopsy for her (likely through genetics clinic)  to assess for unusual maternal mosaicism that was not identified on her peripheral blood karyotype.     Follow-up is scheduled here in 2 weeks as described above.     Return to primary provider for continued prenatal care.    Stevie Patel MD  Maternal Fetal Medicine

## 2018-06-08 NOTE — MR AVS SNAPSHOT
After Visit Summary   6/8/2018    Hannah Severino    MRN: [id number]           Patient Information     Date Of Birth          1995        Visit Information        Provider Department      6/8/2018 1:30 PM Cassandra Woo GC [facility] Maternal Fetal [facility]        Today's Diagnoses     Abnormal maternal serum screening test    -  1    Previous pregnancy complicated by chromosomal abnormality in second trimester, antepartum           Follow-ups after your visit        Your next 10 appointments already scheduled     Jun 22, 2018  1:30 PM CDT   MFM US COMP with MFMUSR1   [facility] Fetal Medicine Ultrasound - [facility] ([facility])    [address]  [facility] [address] [phone]           Wear comfortable clothes and leave your valuables at home.            Jun 22, 2018  2:00 PM CDT   [facility]   [facility] Fetal Medicine [facility])    [address]  [facility] [address] [phone]           Please arrive at the time given for your first appointment. This visit is used internally to schedule the physician's time during your ultrasound.              Who to contact     If you have questions or need follow up information about today's clinic visit or your schedule please contact [facility] MATERNAL FETAL [facility] directly at [phone].  Normal or non-critical lab and imaging results will be communicated to you by MyChart, letter or phone within 4 business days after the clinic has received the results. If you do not hear from us within 7 days, please contact the clinic through MyChart or phone. If you have a critical or abnormal lab result, we will notify you by phone as soon as possible.  Submit refill requests through MessageParty or call your pharmacy and they will forward the refill request to us. Please allow 3 business days for your refill to be completed.     "      Additional Information About Your Visit        MyChart Information     [employer] lets you send messages to your doctor, view your test results, renew your prescriptions, schedule appointments and more. To sign up, go to [email or web address] . Click on \"Log in\" on the left side of the screen, which will take you to the Welcome page. Then click on \"Sign up Now\" on the right side of the page.     You will be asked to enter the access code listed below, as well as some personal information. Please follow the directions to create your username and password.     Your access code is: [email or web address]  Expires: 2018  3:50 PM     Your access code will  in 90 days. If you need help or a new code, please call your [place] clinic or [phone].        Care EveryWhere ID     This is your Care EveryWhere ID. This could be used by other organizations to access your [place] medical records  [id number]        Your Vitals Were     Last Period                   2018            Blood Pressure from Last 3 Encounters:   16 (!) 82/60   16 101/74   16 95/65    Weight from Last 3 Encounters:   16 49.9 kg (110 lb)   16 54 kg (119 lb)   16 54.3 kg (119 lb 12.8 oz)              We Performed the Following     [facility] Genetic Counseling        Primary Care Provider Office Phone # Fax #    Doreen MARADIAGA [place] [phone]       [facility] [address] [facility] [address]  [facility] [address]        Equal Access to Services     [facility] : Hadii aad ku hadasho Soomaali, waaxda luqadaha, qaybta kaalmada gabriella, jesus cantu . So [facility] [phone].    ATENCIÓN: Si habla español, tiene a king disposición servicios gratuitos de asistencia lingüística. Llame al [phone].    We comply with applicable federal civil rights laws and [place] laws. We do not discriminate on the basis of race, color, national origin, age, disability, sex, sexual " orientation, or gender identity.            Thank you!     Thank you for choosing [facility]  for your care. Our goal is always to provide you with excellent care. Hearing back from our patients is one way we can continue to improve our services. Please take a few minutes to complete the written survey that you may receive in the mail after your visit with us. Thank you!             Your Updated Medication List - Protect others around you: Learn how to safely use, store and throw away your medicines at www.disposemymeds.org.          This list is accurate as of 6/8/18 11:59 PM.  Always use your most recent med list.                   Brand Name Dispense Instructions for use Diagnosis    sertraline 20 MG/ML (HIGH CONC) solution    ZOLOFT    75 mL    Take 2.5 mLs (50 mg) by mouth daily    Adjustment disorder with mixed disturbance of emotions and conduct

## 2018-06-09 NOTE — PROGRESS NOTES
"[facility]etal [facility] [place]  Genetic Counseling Consult    Patient:  Hannah Severino YOB: 1995   Date of Service: June 8, 2018 Referring Provider:  Dr. Doreen Osborne     Hannah Severino was seen at the [facility] for genetic counseling consultation as part of her appointment for second trimester complete ultrasound and a possible amniocentesis.  The indication for genetic counseling was her previous history of two pregnancies with Trisomy 18 and her NIPT analysis that was positive for Trisomy 18 in this pregnancy.  She was accompanied to clinic by her mother.       Summary/Plan:     1.   Hannah has had two previous pregnancies with Trisomy 18.  Earlier in this pregnancy, she had a normal first trimester ultrasound with a normal nuchal translucency measurement, but her NIPT analysis came back as \"screen positive\" for Trisomy 18.  These results and her history likely significantly increase the risk of Trisomy 18 in this pregnancy but does NOT provide definitive information about a diagnosis of Trisomy 18 in this pregnancy.    2.  We again reviewed this information today.  We again spent some time talking about that one possible explanation for her previous history is that Hannah herself is mosaic for Trisomy 18 (either somatic or germline mosaicism).  We talked about that if Hannah is mosaic for Trisomy 18, this could lead to a \"false positive\" Trisomy 18 result on an NIPT analysis, since that data analysis includes quantification of all cell-free DNA in the bloodstream, which includes both maternal and placental DNA.  (See discussion below for details.)    3.  We talked about that if Hannah wants additional information about the possibility of mosaicism for Trisomy 18 in herself, she could consider additional chromosome testing for herself from a skin biopsy.  We talked about some of the limitations of the information that would be " provided by this additional potential testing.  (See discussion below for details.)    4.  Hannah stated that she is considering genetic amniocentesis to get definitive information about the possibility of a chromosome problem in this pregnancy.  Hannah recalls her amniocentesis during her first pregnancy as a bad experience, and so we spent some time today talking about her feelings and concerns about amniocentesis.  We did review consent for amniocentesis and chromosome testing, which Hannah then signed.  However, she planned additional conversations with Dr. Patel before proceeding with an amniocentesis.     5.  Hannah had several questions about pregnancy termination options, if this pregnancy is confirmed to have Trisomy 18.  We talked about the options of D&E and induction of labor, and Hannah planned to talk about her questions about D&E in more detail with Dr. Patel.    6.  We also briefly talked about options for future pregnancies in order to attempt to reduce the risk of a chromosome problem in a future pregnancy, such as IVF with preimplantation genetic screening of the embryos for aneuploidy or using an egg donor to achieve a pregnancy.    7.  Hannah asked several insightful questions during our discussion.  After our conversation, Hannah had a second trimester ultrasound, which showed no abnormal findings.  After additional conversations with Dr. Patel, Hannah decided to decline amniocentesis today.  She states that she wants to come back for a comprehensive level II ultrasound in a few weeks time to get more information before deciding on an amniocentesis.  This next ultrasound has been scheduled for June 22, 2018.    Pregnancy/Medical History:       See previous genetic counseling note from 5-11-18 for details.       Family History:       See previous genetic counseling note from 5-11-18 for details.       Risk Assessment for Chromosome Conditions/Review of Previous Test Results:       Hannah has had  "two previous pregnancies with Trisomy 18 (47,XX,+18): a baby that was stillborn at 38 weeks and a first trimester pregnancy loss. During my previous genetic counseling consult with Hannah on 5-11-18, we talked about that this history likely increases the risk for Trisomy 18 (and possibly other chromosome problems) in this pregnancy, but it is hard to be specific about an exact risk.  At that time, I had reviewed with Hannah that one of the possible explanations for recurrent pregnancies with the same non-disjunction event in a young woman is either somatic or germline mosaicism in that woman.      After our discussion in May, Hannah decided to have an ultrasound for nuchal translucency and nasal bone assessment and her blood draw for an NIPT analysis (Innatal test through [employer]).  Her first trimester ultrasound did not note any unusual findings and also showed a normal nuchal translucency.  However, her NIPT analysis came back as \"screen positive\" for Trisomy 18; I had previously reviewed these results with Hannah over the phone on 5-17-18; please see my previous telephone note for details.      We reviewed again today that given these NIPT results and her previous history, I would expect the risk of Trisomy 18 in this pregnancy to be significantly increased; however, this risk would still not be 100%. In particular, we talked about that if Hannah has somatic mosaicism for Trisomy 18 herself, it is possible that this may interfere with the NIPT testing technology and lead to a positive result, even if the fetus is not affected with Trisomy 18. (This is because the NIPT analysis involves quantification of all the cell-free DNA in the maternal blood stream, some of which is maternal in origin and some of which is placental in origin.)       We talked about again that while Hannah's previous blood testing in herself was normal (46,XX), this blood test only looked at 20 cells in her blood, and therefore, does " "NOT rule-out the possibility of mosaicism for Trisomy 18 in her body (either somatic or germline).  We talked about that if Hannah is interested, we could consider additional testing looking for evidence for possible mosaicism in Hannah.  We talked about the limitations of this additional testing:  We talked about that if additional testing confirms somatic mosaicism in Hannah, this does provide us an explanation for her history, but it would not provide specific information about exact recurrence risks for future pregnancies.  We also talked about that negative additional chromosome testing in herself would still not rule-out somatic or germline mosaicism in Hannah.  We also talked about that when I spoke with our cytogenetics  about Hannah's situation, she had recommended that if Hannah wants additional chromosome testing for herself, an analysis from a skin sample might be a better source of cells for additional testing.      We also spent a little bit of time talking about potential options for reducing risks in future pregnancies.  In particular, we talked about assisted reproductive options, such as IVF with preimplantation genetic screening of the embryos for aneupolidy or the use of an egg donor to try to minimize risks of a chromosome problem in a future pregnancy.  We talked about that I would be happy to discuss these options in more detail in the future with Hannah, if that would be helpful.  We did talk about that some individuals in this situation might just \"keep trying\" naturally and see what happens, but Hannah reports that this is not a desired option for her.      Hannah also talked about that she thinks that she would plan on terminating this pregnancy if it is confirmed to be affected with Trisomy 18.  We talked about the options of D&E and induction of labor at this time of pregnancy.  Hannah had a series of detailed questions about the option of D&E, and she was planning to " discuss these further with Dr. Patel.         Additional Testing Options:       We reviewed the benefits and limitations of screening and diagnostic tests:    - We talked about that screening tests provide a risk assessment specific to the pregnancy for certain fetal chromosome abnormalities, but cannot definitively diagnose or exclude a fetal chromosome abnormality. Follow-up genetic counseling and consideration of diagnostic testing is recommended with any abnormal screening result.     - We discussed that diagnostic tests are associated with a risk of miscarriage. These tests can detect fetal chromosome abnormalities with greater than 99% certainty. Results can rarely be complicated by maternal cell contamination or mosaicism and are limited by the resolution of cytogenetic G-banding technology.     -There is no screening nor diagnostic test that can detect all forms of birth defects or mental disability.      We discussed the following screening and testing options:     Genetic Amniocentesis  - This is an invasive procedure typically performed at 15 weeks or later, through which amniotic fluid is obtained for the purpose of chromosome analysis and/or other prenatal genetic analysis.  - Amniocentesis is considered a diagnostic test for chromosome problems during pregnancy.  - The risk of pregnancy loss associated with amniocentesis is generally estimated to be 1/500 or less.  - Amniotic fluid AFP measurement is also done to screen for the possibility of open neural tube or ventral defects.     Comprehensive (Level II) ultrasound  - This detailed ultrasound is usually performed between 18-22 weeks gestation to screen for major birth defects.  - It also screens for ultrasound markers that might increase the risk for a chromosome problem in a pregnancy.    Face-to-face time of the genetic counseling consult was 40 minutes.    Jody Woo MS, [facility]  Genetic Counselor  Ph: [phone]  Pager: [phone]

## 2018-06-22 ENCOUNTER — RECORDS - HEALTHEAST (OUTPATIENT)
Dept: ADMINISTRATIVE | Facility: OTHER | Age: 23
End: 2018-06-22

## 2018-06-22 ENCOUNTER — HOSPITAL ENCOUNTER (OUTPATIENT)
Dept: ULTRASOUND IMAGING | Facility: CLINIC | Age: 23
Discharge: HOME | End: 2018-06-22
Attending: OBSTETRICS & GYNECOLOGY | Admitting: OBSTETRICS & GYNECOLOGY
Payer: MEDICAID

## 2018-06-22 ENCOUNTER — OFFICE VISIT (OUTPATIENT)
Dept: MATERNAL FETAL MEDICINE | Facility: CLINIC | Age: 23
End: 2018-06-22
Attending: OBSTETRICS & GYNECOLOGY
Payer: MEDICAID

## 2018-06-22 ENCOUNTER — HOSPITAL ENCOUNTER (OUTPATIENT)
Dept: LAB | Facility: CLINIC | Age: 23
End: 2018-06-22
Attending: OBSTETRICS & GYNECOLOGY
Payer: MEDICAID

## 2018-06-22 DIAGNOSIS — Z82.79 FAMILY HISTORY OF CHROMOSOMAL ABNORMALITY: ICD-10-CM

## 2018-06-22 DIAGNOSIS — O28.0 ABNORMAL MATERNAL SERUM SCREENING TEST: Primary | ICD-10-CM

## 2018-06-22 DIAGNOSIS — O09.299 PREVIOUS PREGNANCY COMPLICATED BY CHROMOSOMAL ABNORMALITY, ANTEPARTUM: ICD-10-CM

## 2018-06-22 DIAGNOSIS — O28.0 ABNORMAL MATERNAL SERUM SCREENING TEST: ICD-10-CM

## 2018-06-22 PROCEDURE — 88285 CHROMOSOME COUNT ADDITIONAL: CPT | Performed by: OBSTETRICS & GYNECOLOGY

## 2018-06-22 PROCEDURE — 88280 CHROMOSOME KARYOTYPE STUDY: CPT | Performed by: OBSTETRICS & GYNECOLOGY

## 2018-06-22 PROCEDURE — 88269 CHROMOSOME ANALYS AMNIOTIC: CPT | Performed by: OBSTETRICS & GYNECOLOGY

## 2018-06-22 PROCEDURE — 88275 CYTOGENETICS 100-300: CPT | Performed by: OBSTETRICS & GYNECOLOGY

## 2018-06-22 PROCEDURE — 88235 TISSUE CULTURE PLACENTA: CPT | Performed by: OBSTETRICS & GYNECOLOGY

## 2018-06-22 PROCEDURE — 82106 ALPHA-FETOPROTEIN AMNIOTIC: CPT | Performed by: OBSTETRICS & GYNECOLOGY

## 2018-06-22 PROCEDURE — 76811 OB US DETAILED SNGL FETUS: CPT

## 2018-06-22 PROCEDURE — 76946 ECHO GUIDE FOR AMNIOCENTESIS: CPT | Performed by: OBSTETRICS & GYNECOLOGY

## 2018-06-22 PROCEDURE — 40000072 ZZH STATISTIC GENETIC COUNSELING, < 16 MIN: Mod: ZF | Performed by: GENETIC COUNSELOR, MS

## 2018-06-22 PROCEDURE — 88271 CYTOGENETICS DNA PROBE: CPT | Performed by: OBSTETRICS & GYNECOLOGY

## 2018-06-22 NOTE — MR AVS SNAPSHOT
After Visit Summary   6/22/2018    Hannah Severino    MRN: [id number]           Patient Information     Date Of Birth          1995        Visit Information        Provider Department      6/22/2018 2:15 PM  GEN COUNSELOR 1 [facility] Maternal Fetal Medicine [facility]        Today's Diagnoses     Abnormal maternal serum screening test        Family history of chromosomal abnormality           Follow-ups after your visit        Who to contact     If you have questions or need follow up information about today's clinic visit or your schedule please contact [facility] FETAL [facility] directly at [phone].  Normal or non-critical lab and imaging results will be communicated to you by MyChart, letter or phone within 4 business days after the clinic has received the results. If you do not hear from us within 7 days, please contact the clinic through Zeccohart or phone. If you have a critical or abnormal lab result, we will notify you by phone as soon as possible.  Submit refill requests through Rypple or call your pharmacy and they will forward the refill request to us. Please allow 3 business days for your refill to be completed.          Additional Information About Your Visit        Care EveryWhere ID     This is your Care EveryWhere ID. This could be used by other organizations to access your [place] medical records  [id number]        Your Vitals Were     Last Period                   02/12/2018            Blood Pressure from Last 3 Encounters:   12/13/16 (!) 82/60   11/29/16 101/74   09/18/16 95/65    Weight from Last 3 Encounters:   12/13/16 49.9 kg (110 lb)   08/01/16 54 kg (119 lb)   07/25/16 54.3 kg (119 lb 12.8 oz)              We Performed the Following     [facility] Genetic Counseling        Primary Care Provider Office Phone # Fax #    Doreen Osborne [phone]       [facility] [address] [address]        Equal Access  to Services     YANE CLIFTON : Macario Flores, kayy gaston, jesus turk. So [facility] [phone].    ATENCIÓN: Si habla español, tiene a king disposición servicios gratuitos de asistencia lingüística. Llame al [phone].    We comply with applicable federal civil rights laws and [place] laws. We do not discriminate on the basis of race, color, national origin, age, disability, sex, sexual orientation, or gender identity.            Thank you!     Thank you for choosing [facility]  for your care. Our goal is always to provide you with excellent care. Hearing back from our patients is one way we can continue to improve our services. Please take a few minutes to complete the written survey that you may receive in the mail after your visit with us. Thank you!             Your Updated Medication List - Protect others around you: Learn how to safely use, store and throw away your medicines at www.disposemymeds.org.          This list is accurate as of 6/22/18  4:00 PM.  Always use your most recent med list.                   Brand Name Dispense Instructions for use Diagnosis    sertraline 20 MG/ML (HIGH CONC) solution    ZOLOFT    75 mL    Take 2.5 mLs (50 mg) by mouth daily    Adjustment disorder with mixed disturbance of emotions and conduct

## 2018-06-22 NOTE — MR AVS SNAPSHOT
After Visit Summary   6/22/2018    Hannah Severino    MRN: [id number]           Patient Information     Date Of Birth          1995        Visit Information        Provider Department      6/22/2018 2:00 PM Carl Guzman MD [facility] Maternal Fetal Medicine [facility]        Today's Diagnoses     Abnormal maternal serum screening test    -  1    Family history of chromosomal abnormality           Follow-ups after your visit        Who to contact     If you have questions or need follow up information about today's clinic visit or your schedule please contact [facility] MATERNAL FETAL [facility] directly at [phone].  Normal or non-critical lab and imaging results will be communicated to you by MyChart, letter or phone within 4 business days after the clinic has received the results. If you do not hear from us within 7 days, please contact the clinic through Doisthart or phone. If you have a critical or abnormal lab result, we will notify you by phone as soon as possible.  Submit refill requests through EVIAGENICS or call your pharmacy and they will forward the refill request to us. Please allow 3 business days for your refill to be completed.          Additional Information About Your Visit        Care EveryWhere ID     This is your Care EveryWhere ID. This could be used by other organizations to access your [place] medical records  [id number]        Your Vitals Were     Last Period                   02/12/2018            Blood Pressure from Last 3 Encounters:   12/13/16 (!) 82/60   11/29/16 101/74   09/18/16 95/65    Weight from Last 3 Encounters:   12/13/16 49.9 kg (110 lb)   08/01/16 54 kg (119 lb)   07/25/16 54.3 kg (119 lb 12.8 oz)              We Performed the Following     AFP Amniotic Reflex to Acetylchol     CHROMOSOME AMNIOTIC FLUID With Professional Interpretation     FISH ANEUSCREEN With Professional Interpretation     OBTAIN AFFIRMATION OF INFORMED CONSENT        Primary  Care Provider Office Phone # Fax #    Doreen Osborne [phone]       [facility] [address]  [facility] [address]        Equal Access to Services     YANE CLIFTON : Hadii chandrakant ku hadvikao Soomaali, waaxda luqadaha, qaybta kaalmada adeegyada, waxshyam idiin keeshan pan andrade laLuismarge vines. So [facility] [phone].    ATENCIÓN: Si habla español, tiene a king disposición servicios gratuitos de asistencia lingüística. Llame al [phone].    We comply with applicable federal civil rights laws and [place] laws. We do not discriminate on the basis of race, color, national origin, age, disability, sex, sexual orientation, or gender identity.            Thank you!     Thank you for choosing [facility]  for your care. Our goal is always to provide you with excellent care. Hearing back from our patients is one way we can continue to improve our services. Please take a few minutes to complete the written survey that you may receive in the mail after your visit with us. Thank you!             Your Updated Medication List - Protect others around you: Learn how to safely use, store and throw away your medicines at www.disposemymeds.org.          This list is accurate as of 6/22/18  4:23 PM.  Always use your most recent med list.                   Brand Name Dispense Instructions for use Diagnosis    sertraline 20 MG/ML (HIGH CONC) solution    ZOLOFT    75 mL    Take 2.5 mLs (50 mg) by mouth daily    Adjustment disorder with mixed disturbance of emotions and conduct

## 2018-06-22 NOTE — NURSING NOTE
Pt here for amniocentesis d/t Abnormal NIPT. Saw [facility], see their dictation.  After consent signed and TimeOut completed, Dr. Guzman withdrew adequate fluid x1 transabdominal pass.    Patient reports 0/10 pain.  Pt is RH +. Discharge teaching completed and questions answered.  Pt discharged ambulatory and stable.     Cytogenetics notified of specimen.  Specimen transported to main lab, warm hand-off completed. No further follow up scheduled at this time.

## 2018-06-22 NOTE — PROGRESS NOTES
[facility]  Genetic Counseling Consult    Patient:  Hannah Severino YOB: 1995   Date of Service:  18      Hannah Severino was seen at the [facility] for genetic consultation as part of her appointment for comprehensive ultrasound and amniocentesis.  The indications is positive NIPT screen, high risk for trisomy 18.         Impression/Plan:   1. Genetic counseling met with Hannah to arrange for her amniocentesis after her NIPT results indicated a high risk for her pregnancy to be affected with Trisomy 18.  Hannah has met previously with genetic counseling regarding this topic and returned to [facility] today for a level II ultrasound and amniocentesis.      2. The patient had an ultrasound and amniocentesis. The following testing was ordered on the prenatal sample: Chromosome analysis, FISH preliminary analysis and AFAFP.  Results are expected in approximately 2 days for FISH, and 10-14 days for chromosome analysis, and will be available in EPIC.  We will contact her to discuss the results, and a copy will be forwarded to the office of the referring OB provider.  Hannah requests that detailed results be left in her voicemail if she cannot be reached.      Pregnancy History:   /Parity:    Age at Delivery: 23 year old  ALF: 2018, by Other Basis  Gestational Age: 18w3d    No significant complications or exposures were reported in the current pregnancy.    Hannah s pregnancy history is significant for 2 previous pregnancies with trisomy 18.    Discussion:     I met briefly with Hannah and her mother to review amniocentesis and genetic testing consents.  Please see previous documentation for an in depth discussion regarding Hannah's pregnancy and testing history.  Appropriate consent was obtained for testing and is scanned into the medical record.        It was a pleasure to be involved with Hannah s care.  Face-to-face time of the meeting was 10 minutes.    Stuart West MS, [facility]  Licensed Genetic Counselor  Phone: [phone]  Pager: [phone]

## 2018-06-25 ENCOUNTER — TELEPHONE (OUTPATIENT)
Dept: MATERNAL FETAL MEDICINE | Facility: CLINIC | Age: 23
End: 2018-06-25

## 2018-06-25 LAB
A-FETO PROTEIN  AMNGEST AGE ULTRA: 18.4
AFP AMN-MCNC: 6477 NG/ML
AFP INTERP AMN-IMP: NEGATIVE
AFP MOM AMN: 0.75
COPATH REPORT: NORMAL
GA: NORMAL WK
LMP START DATE: NORMAL

## 2018-06-25 NOTE — TELEPHONE ENCOUNTER
6/25/2018       Called Hannah to discuss FISH results from her amniocentesis.  Results came back normal female pattern, indicating two copies each of chromosomes 18, 21, & 13, as well as two copies of the X chromosome.  These results are preliminary but greatly reduce the likelihood for Hannah's pregnancy to be affected with trisomy 18.  Final results will come from chromosome analysis results which are expected in 10-14 days.  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  Hannah had no questions at this time and was encouraged to reach out if she has any questions or concerns in the future.       Stuart West MS, [facility]  Licensed Genetic Counselor  Phone: [phone]  Pager: [phone]

## 2018-06-26 ENCOUNTER — COMMUNICATION - HEALTHEAST (OUTPATIENT)
Dept: FAMILY MEDICINE | Facility: CLINIC | Age: 23
End: 2018-06-26

## 2018-07-05 ENCOUNTER — TELEPHONE (OUTPATIENT)
Dept: MATERNAL FETAL MEDICINE | Facility: CLINIC | Age: 23
End: 2018-07-05

## 2018-07-05 LAB — COPATH REPORT: NORMAL

## 2018-07-05 NOTE — TELEPHONE ENCOUNTER
7/5/2018    Called Hannah to discuss the final results from her amniocentesis.  Final chromosome analysis results are normal, female karyotype, 46, XX.  This testing indicates no significant structural chromosomal rearrangements, deletions or duplications, and no evidence for Hannah's ongoing pregnancy to be affected with trisomy 18.  AFP screening was normal as well indicating low risk for open neural tube defects such as spina bifida.  This information was called and discussed with Hannah who had no questions at this time and was encouraged to contact me with any questions or concerns in the future.      Stuart West MS, [facility]  Licensed Genetic Counselor  Phone: [phone]  Pager: [phone]

## 2018-07-06 DIAGNOSIS — O09.299 TRISOMY 18 IN CHILD OF PRIOR PREGNANCY, CURRENTLY PREGNANT: Primary | ICD-10-CM

## 2018-07-09 ENCOUNTER — PRENATAL OFFICE VISIT - HEALTHEAST (OUTPATIENT)
Dept: FAMILY MEDICINE | Facility: CLINIC | Age: 23
End: 2018-07-09

## 2018-07-09 DIAGNOSIS — R00.2 PALPITATIONS: ICD-10-CM

## 2018-07-09 DIAGNOSIS — Z34.90 PREGNANCY: ICD-10-CM

## 2018-07-09 LAB
ALBUMIN UR-MCNC: NEGATIVE MG/DL
APPEARANCE UR: CLEAR
BASOPHILS # BLD AUTO: 0 THOU/UL (ref 0–0.2)
BASOPHILS NFR BLD AUTO: 0 % (ref 0–2)
BILIRUB UR QL STRIP: NEGATIVE
CLUE CELLS: NORMAL
COLOR UR AUTO: YELLOW
EOSINOPHIL # BLD AUTO: 0.1 THOU/UL (ref 0–0.4)
EOSINOPHIL NFR BLD AUTO: 1 % (ref 0–6)
ERYTHROCYTE [DISTWIDTH] IN BLOOD BY AUTOMATED COUNT: 13.2 % (ref 11–14.5)
GLUCOSE UR STRIP-MCNC: NEGATIVE MG/DL
HCT VFR BLD AUTO: 36.1 % (ref 35–47)
HGB BLD-MCNC: 12.8 G/DL (ref 12–16)
HGB UR QL STRIP: NEGATIVE
HIV 1+2 AB+HIV1 P24 AG SERPL QL IA: NEGATIVE
KETONES UR STRIP-MCNC: NEGATIVE MG/DL
LEUKOCYTE ESTERASE UR QL STRIP: NEGATIVE
LYMPHOCYTES # BLD AUTO: 2.2 THOU/UL (ref 0.8–4.4)
LYMPHOCYTES NFR BLD AUTO: 18 % (ref 20–40)
MCH RBC QN AUTO: 33.5 PG (ref 27–34)
MCHC RBC AUTO-ENTMCNC: 35.5 G/DL (ref 32–36)
MCV RBC AUTO: 95 FL (ref 80–100)
MONOCYTES # BLD AUTO: 0.9 THOU/UL (ref 0–0.9)
MONOCYTES NFR BLD AUTO: 7 % (ref 2–10)
NEUTROPHILS # BLD AUTO: 9.2 THOU/UL (ref 2–7.7)
NEUTROPHILS NFR BLD AUTO: 75 % (ref 50–70)
NITRATE UR QL: NEGATIVE
PH UR STRIP: 6 [PH] (ref 5–8)
PLATELET # BLD AUTO: 215 THOU/UL (ref 140–440)
PMV BLD AUTO: 10 FL (ref 8.5–12.5)
RBC # BLD AUTO: 3.82 MILL/UL (ref 3.8–5.4)
SP GR UR STRIP: 1.01 (ref 1–1.03)
TRICHOMONAS, WET PREP: NORMAL
UROBILINOGEN UR STRIP-ACNC: NORMAL
WBC: 12.5 THOU/UL (ref 4–11)
YEAST, WET PREP: NORMAL

## 2018-07-10 LAB
ABO/RH(D): NORMAL
ABORH REPEAT: NORMAL
ANTIBODY SCREEN: NEGATIVE
BACTERIA SPEC CULT: NO GROWTH
HBV SURFACE AG SERPL QL IA: NEGATIVE
RUBV IGG SERPL QL IA: POSITIVE
T PALLIDUM AB SER QL: NEGATIVE

## 2018-07-11 ENCOUNTER — COMMUNICATION - HEALTHEAST (OUTPATIENT)
Dept: FAMILY MEDICINE | Facility: CLINIC | Age: 23
End: 2018-07-11

## 2018-07-11 LAB
C TRACH DNA SPEC QL PROBE+SIG AMP: POSITIVE
N GONORRHOEA DNA SPEC QL NAA+PROBE: NEGATIVE

## 2018-07-23 ENCOUNTER — HOSPITAL ENCOUNTER (OUTPATIENT)
Dept: CARDIOLOGY | Facility: HOSPITAL | Age: 23
Discharge: HOME | End: 2018-07-23
Attending: FAMILY MEDICINE

## 2018-07-23 DIAGNOSIS — R00.2 PALPITATIONS: ICD-10-CM

## 2018-08-06 ENCOUNTER — PRENATAL OFFICE VISIT - HEALTHEAST (OUTPATIENT)
Dept: FAMILY MEDICINE | Facility: CLINIC | Age: 23
End: 2018-08-06

## 2018-08-06 DIAGNOSIS — Z3A.24 24 WEEKS GESTATION OF PREGNANCY: ICD-10-CM

## 2018-08-06 LAB
ALBUMIN UR-MCNC: NEGATIVE MG/DL
APPEARANCE UR: ABNORMAL
BILIRUB UR QL STRIP: NEGATIVE
CLUE CELLS: NORMAL
COLOR UR AUTO: YELLOW
GLUCOSE UR STRIP-MCNC: NEGATIVE MG/DL
HGB UR QL STRIP: NEGATIVE
KETONES UR STRIP-MCNC: NEGATIVE MG/DL
LEUKOCYTE ESTERASE UR QL STRIP: ABNORMAL
NITRATE UR QL: NEGATIVE
PH UR STRIP: 7 [PH] (ref 5–8)
SP GR UR STRIP: 1.01 (ref 1–1.03)
TRICHOMONAS, WET PREP: NORMAL
UROBILINOGEN UR STRIP-ACNC: ABNORMAL
YEAST, WET PREP: NORMAL

## 2018-08-07 ENCOUNTER — AMBULATORY - HEALTHEAST (OUTPATIENT)
Dept: FAMILY MEDICINE | Facility: CLINIC | Age: 23
End: 2018-08-07

## 2018-08-07 LAB
BACTERIA #/AREA URNS HPF: ABNORMAL HPF
BACTERIA SPEC CULT: NO GROWTH
C TRACH DNA SPEC QL PROBE+SIG AMP: NEGATIVE
MUCOUS THREADS #/AREA URNS LPF: ABNORMAL LPF
N GONORRHOEA DNA SPEC QL NAA+PROBE: NEGATIVE
RBC #/AREA URNS AUTO: ABNORMAL HPF
SQUAMOUS #/AREA URNS AUTO: ABNORMAL LPF
TRANS CELLS #/AREA URNS HPF: ABNORMAL LPF
WBC #/AREA URNS AUTO: ABNORMAL HPF

## 2018-08-10 ENCOUNTER — HOSPITAL ENCOUNTER (OUTPATIENT)
Dept: ULTRASOUND IMAGING | Facility: CLINIC | Age: 23
Discharge: HOME | End: 2018-08-10
Attending: OBSTETRICS & GYNECOLOGY | Admitting: OBSTETRICS & GYNECOLOGY
Payer: COMMERCIAL

## 2018-08-10 ENCOUNTER — OFFICE VISIT (OUTPATIENT)
Dept: MATERNAL FETAL MEDICINE | Facility: CLINIC | Age: 23
End: 2018-08-10
Attending: OBSTETRICS & GYNECOLOGY
Payer: COMMERCIAL

## 2018-08-10 ENCOUNTER — RECORDS - HEALTHEAST (OUTPATIENT)
Dept: ADMINISTRATIVE | Facility: OTHER | Age: 23
End: 2018-08-10

## 2018-08-10 DIAGNOSIS — O09.299 TRISOMY 18 IN CHILD OF PRIOR PREGNANCY, CURRENTLY PREGNANT: Primary | ICD-10-CM

## 2018-08-10 DIAGNOSIS — O09.299 TRISOMY 18 IN CHILD OF PRIOR PREGNANCY, CURRENTLY PREGNANT: ICD-10-CM

## 2018-08-10 PROCEDURE — 76816 OB US FOLLOW-UP PER FETUS: CPT

## 2018-08-10 NOTE — MR AVS SNAPSHOT
After Visit Summary   8/10/2018    Hannah Severino    MRN: [id number]           Patient Information     Date Of Birth          1995        Visit Information        Provider Department      8/10/2018 2:00 PM Keesha Pike MD [facility] Maternal Fetal Medicine [facility]        Today's Diagnoses     Trisomy 18 in child of prior pregnancy, currently pregnant    -  1       Follow-ups after your visit        Who to contact     If you have questions or need follow up information about today's clinic visit or your schedule please contact [facility] MATERNAL FETAL MEDICINE [facility] directly at [phone].  Normal or non-critical lab and imaging results will be communicated to you by MyChart, letter or phone within 4 business days after the clinic has received the results. If you do not hear from us within 7 days, please contact the clinic through MyChart or phone. If you have a critical or abnormal lab result, we will notify you by phone as soon as possible.  Submit refill requests through Bjond or call your pharmacy and they will forward the refill request to us. Please allow 3 business days for your refill to be completed.          Additional Information About Your Visit        Care EveryWhere ID     This is your Care EveryWhere ID. This could be used by other organizations to access your [place] medical records  [id number]        Your Vitals Were     Last Period                   02/12/2018            Blood Pressure from Last 3 Encounters:   12/13/16 (!) 82/60   11/29/16 101/74   09/18/16 95/65    Weight from Last 3 Encounters:   12/13/16 49.9 kg (110 lb)   08/01/16 54 kg (119 lb)   07/25/16 54.3 kg (119 lb 12.8 oz)              Today, you had the following     No orders found for display       Primary Care Provider Office Phone # Fax #    Doreen Osborne [phone]       [facility] [address] [facility] [address]  [facility] [address]        Equal Access to Services     YANE  GAAR : Hadii chandrakant hicks tristen Flores, wajenniferda luqadaha, qaybta kagibran saundradavidlaex, waxshyam naresh haymarge adamsrandyconchita vines. So [facility] [phone].    ATENCIÓN: Si habla español, tiene a king disposición servicios gratuitos de asistencia lingüística. Llame al [phone].    We comply with applicable federal civil rights laws and [place] laws. We do not discriminate on the basis of race, color, national origin, age, disability, sex, sexual orientation, or gender identity.            Thank you!     Thank you for choosing [facility]  for your care. Our goal is always to provide you with excellent care. Hearing back from our patients is one way we can continue to improve our services. Please take a few minutes to complete the written survey that you may receive in the mail after your visit with us. Thank you!             Your Updated Medication List - Protect others around you: Learn how to safely use, store and throw away your medicines at www.disposemymeds.org.          This list is accurate as of 8/10/18  2:23 PM.  Always use your most recent med list.                   Brand Name Dispense Instructions for use Diagnosis    sertraline 20 MG/ML (HIGH CONC) solution    ZOLOFT    75 mL    Take 2.5 mLs (50 mg) by mouth daily    Adjustment disorder with mixed disturbance of emotions and conduct

## 2018-08-10 NOTE — PROGRESS NOTES
"Please see \"Imaging\" tab under Chart Review for full details.    Keesha Pike MD  Maternal Fetal Medicine    "

## 2018-09-04 ENCOUNTER — PRENATAL OFFICE VISIT - HEALTHEAST (OUTPATIENT)
Dept: FAMILY MEDICINE | Facility: CLINIC | Age: 23
End: 2018-09-04

## 2018-09-04 DIAGNOSIS — Z34.90 PREGNANCY: ICD-10-CM

## 2018-09-04 DIAGNOSIS — F33.1 MODERATE EPISODE OF RECURRENT MAJOR DEPRESSIVE DISORDER (H): ICD-10-CM

## 2018-09-04 LAB
FASTING STATUS PATIENT QL REPORTED: YES
GLUCOSE 1H P 50 G GLC PO SERPL-MCNC: 108 MG/DL (ref 70–139)
HGB BLD-MCNC: 10.6 G/DL (ref 12–16)

## 2018-09-05 LAB — T PALLIDUM AB SER QL: NEGATIVE

## 2018-09-06 ENCOUNTER — COMMUNICATION - HEALTHEAST (OUTPATIENT)
Dept: FAMILY MEDICINE | Facility: CLINIC | Age: 23
End: 2018-09-06

## 2018-09-10 ENCOUNTER — HOSPITAL ENCOUNTER (OUTPATIENT)
Dept: ULTRASOUND IMAGING | Facility: HOSPITAL | Age: 23
Discharge: HOME | End: 2018-09-10
Attending: FAMILY MEDICINE

## 2018-09-10 DIAGNOSIS — Z34.90 PREGNANCY: ICD-10-CM

## 2018-09-10 DIAGNOSIS — F33.1 MODERATE EPISODE OF RECURRENT MAJOR DEPRESSIVE DISORDER (H): ICD-10-CM

## 2018-09-17 ENCOUNTER — PRENATAL OFFICE VISIT - HEALTHEAST (OUTPATIENT)
Dept: FAMILY MEDICINE | Facility: CLINIC | Age: 23
End: 2018-09-17

## 2018-09-17 DIAGNOSIS — Z3A.30 30 WEEKS GESTATION OF PREGNANCY: ICD-10-CM

## 2018-10-01 ENCOUNTER — PRENATAL OFFICE VISIT - HEALTHEAST (OUTPATIENT)
Dept: FAMILY MEDICINE | Facility: CLINIC | Age: 23
End: 2018-10-01

## 2018-10-01 DIAGNOSIS — Z3A.32 32 WEEKS GESTATION OF PREGNANCY: ICD-10-CM

## 2018-10-11 ENCOUNTER — HOSPITAL ENCOUNTER (OUTPATIENT)
Dept: OBGYN | Facility: HOSPITAL | Age: 23
Discharge: HOME | End: 2018-10-12
Attending: FAMILY MEDICINE | Admitting: FAMILY MEDICINE

## 2018-10-11 LAB
ALBUMIN UR-MCNC: NEGATIVE MG/DL
APPEARANCE UR: CLEAR
BACTERIA #/AREA URNS HPF: ABNORMAL HPF
BILIRUB UR QL STRIP: NEGATIVE
COLOR UR AUTO: YELLOW
GLUCOSE UR STRIP-MCNC: NEGATIVE MG/DL
HGB UR QL STRIP: NEGATIVE
KETONES UR STRIP-MCNC: NEGATIVE MG/DL
LEUKOCYTE ESTERASE UR QL STRIP: ABNORMAL
MUCOUS THREADS #/AREA URNS LPF: ABNORMAL LPF
NITRATE UR QL: NEGATIVE
PH UR STRIP: 6.5 [PH] (ref 4.5–8)
RBC #/AREA URNS AUTO: ABNORMAL HPF
SP GR UR STRIP: 1.01 (ref 1–1.03)
SQUAMOUS #/AREA URNS AUTO: ABNORMAL LPF
UROBILINOGEN UR STRIP-ACNC: ABNORMAL
WBC #/AREA URNS AUTO: ABNORMAL HPF

## 2018-10-12 LAB
ALBUMIN SERPL-MCNC: 2.8 G/DL (ref 3.5–5)
ALP SERPL-CCNC: 86 U/L (ref 45–120)
ALT SERPL W P-5'-P-CCNC: 11 U/L (ref 0–45)
ANION GAP SERPL CALCULATED.3IONS-SCNC: 11 MMOL/L (ref 5–18)
AST SERPL W P-5'-P-CCNC: 15 U/L (ref 0–40)
BASOPHILS # BLD AUTO: 0 THOU/UL (ref 0–0.2)
BASOPHILS NFR BLD AUTO: 0 % (ref 0–2)
BILIRUB SERPL-MCNC: 0.3 MG/DL (ref 0–1)
BUN SERPL-MCNC: 6 MG/DL (ref 8–22)
CALCIUM SERPL-MCNC: 8.6 MG/DL (ref 8.5–10.5)
CHLORIDE BLD-SCNC: 109 MMOL/L (ref 98–107)
CLUE CELLS: NORMAL
CO2 SERPL-SCNC: 18 MMOL/L (ref 22–31)
CREAT SERPL-MCNC: 0.56 MG/DL (ref 0.6–1.1)
EOSINOPHIL # BLD AUTO: 0.1 THOU/UL (ref 0–0.4)
EOSINOPHIL NFR BLD AUTO: 1 % (ref 0–6)
ERYTHROCYTE [DISTWIDTH] IN BLOOD BY AUTOMATED COUNT: 14.2 % (ref 11–14.5)
GFR SERPL CREATININE-BSD FRML MDRD: >60 ML/MIN/1.73M2
GLUCOSE BLD-MCNC: 99 MG/DL (ref 70–125)
HCT VFR BLD AUTO: 34.1 % (ref 35–47)
HGB BLD-MCNC: 11.9 G/DL (ref 12–16)
LYMPHOCYTES # BLD AUTO: 1.8 THOU/UL (ref 0.8–4.4)
LYMPHOCYTES NFR BLD AUTO: 14 % (ref 20–40)
MCH RBC QN AUTO: 33.6 PG (ref 27–34)
MCHC RBC AUTO-ENTMCNC: 34.9 G/DL (ref 32–36)
MCV RBC AUTO: 96 FL (ref 80–100)
MONOCYTES # BLD AUTO: 0.9 THOU/UL (ref 0–0.9)
MONOCYTES NFR BLD AUTO: 7 % (ref 2–10)
NEUTROPHILS # BLD AUTO: 9.7 THOU/UL (ref 2–7.7)
NEUTROPHILS NFR BLD AUTO: 78 % (ref 50–70)
PLATELET # BLD AUTO: 191 THOU/UL (ref 140–440)
PMV BLD AUTO: 9.4 FL (ref 8.5–12.5)
POTASSIUM BLD-SCNC: 3.7 MMOL/L (ref 3.5–5)
PROT SERPL-MCNC: 5.9 G/DL (ref 6–8)
RBC # BLD AUTO: 3.54 MILL/UL (ref 3.8–5.4)
SODIUM SERPL-SCNC: 138 MMOL/L (ref 136–145)
TRICHOMONAS, WET PREP: NORMAL
WBC: 12.6 THOU/UL (ref 4–11)
YEAST, WET PREP: NORMAL

## 2018-10-13 LAB — BACTERIA SPEC CULT: NO GROWTH

## 2018-10-15 ENCOUNTER — PRENATAL OFFICE VISIT - HEALTHEAST (OUTPATIENT)
Dept: FAMILY MEDICINE | Facility: CLINIC | Age: 23
End: 2018-10-15

## 2018-10-15 DIAGNOSIS — B00.9 HERPES SIMPLEX VIRUS (HSV) INFECTION: ICD-10-CM

## 2018-10-15 DIAGNOSIS — Z34.90 PREGNANCY: ICD-10-CM

## 2018-10-23 ENCOUNTER — COMMUNICATION - HEALTHEAST (OUTPATIENT)
Dept: FAMILY MEDICINE | Facility: CLINIC | Age: 23
End: 2018-10-23

## 2018-10-29 ENCOUNTER — PRENATAL OFFICE VISIT - HEALTHEAST (OUTPATIENT)
Dept: FAMILY MEDICINE | Facility: CLINIC | Age: 23
End: 2018-10-29

## 2018-10-29 DIAGNOSIS — Z34.90 PREGNANCY: ICD-10-CM

## 2018-10-29 DIAGNOSIS — K40.90 RIGHT INGUINAL HERNIA: ICD-10-CM

## 2018-10-30 LAB
ALLERGIC TO PENICILLIN: NO
GP B STREP DNA SPEC QL NAA+PROBE: NEGATIVE

## 2018-11-05 ENCOUNTER — PRENATAL OFFICE VISIT - HEALTHEAST (OUTPATIENT)
Dept: FAMILY MEDICINE | Facility: CLINIC | Age: 23
End: 2018-11-05

## 2018-11-05 DIAGNOSIS — Z34.90 PREGNANCY: ICD-10-CM

## 2018-11-12 ENCOUNTER — PRENATAL OFFICE VISIT - HEALTHEAST (OUTPATIENT)
Dept: FAMILY MEDICINE | Facility: CLINIC | Age: 23
End: 2018-11-12

## 2018-11-12 DIAGNOSIS — Z3A.38 38 WEEKS GESTATION OF PREGNANCY: ICD-10-CM

## 2018-11-19 ENCOUNTER — PRENATAL OFFICE VISIT - HEALTHEAST (OUTPATIENT)
Dept: FAMILY MEDICINE | Facility: CLINIC | Age: 23
End: 2018-11-19

## 2018-11-19 DIAGNOSIS — Z34.90 PREGNANCY, UNSPECIFIED GESTATIONAL AGE: ICD-10-CM

## 2018-11-20 ENCOUNTER — ANESTHESIA - HEALTHEAST (OUTPATIENT)
Dept: OBGYN | Facility: HOSPITAL | Age: 23
End: 2018-11-20

## 2018-11-22 ENCOUNTER — HOME CARE/HOSPICE - HEALTHEAST (OUTPATIENT)
Dept: HOME HEALTH SERVICES | Facility: HOME HEALTH | Age: 23
End: 2018-11-22

## 2018-11-24 ENCOUNTER — HOME CARE/HOSPICE - HEALTHEAST (OUTPATIENT)
Dept: HOME HEALTH SERVICES | Facility: HOME HEALTH | Age: 23
End: 2018-11-24

## 2019-01-31 ENCOUNTER — OFFICE VISIT - HEALTHEAST (OUTPATIENT)
Dept: FAMILY MEDICINE | Facility: CLINIC | Age: 24
End: 2019-01-31

## 2019-01-31 ENCOUNTER — AMBULATORY - HEALTHEAST (OUTPATIENT)
Dept: FAMILY MEDICINE | Facility: CLINIC | Age: 24
End: 2019-01-31

## 2019-01-31 LAB
HCG SERPL-ACNC: <2 MLU/ML (ref 0–4)
HGB BLD-MCNC: 14.7 G/DL (ref 12–16)

## 2019-02-12 ENCOUNTER — AMBULATORY - HEALTHEAST (OUTPATIENT)
Dept: FAMILY MEDICINE | Facility: CLINIC | Age: 24
End: 2019-02-12

## 2019-02-12 DIAGNOSIS — Z30.018 ENCOUNTER FOR INITIAL PRESCRIPTION OF OTHER CONTRACEPTIVES: ICD-10-CM

## 2019-02-12 LAB
HCG UR QL: NEGATIVE
SP GR UR STRIP: 1.01 (ref 1–1.03)

## 2019-02-12 ASSESSMENT — MIFFLIN-ST. JEOR: SCORE: 1238.49

## 2019-02-21 ENCOUNTER — COMMUNICATION - HEALTHEAST (OUTPATIENT)
Dept: FAMILY MEDICINE | Facility: CLINIC | Age: 24
End: 2019-02-21

## 2019-02-26 ENCOUNTER — OFFICE VISIT - HEALTHEAST (OUTPATIENT)
Dept: FAMILY MEDICINE | Facility: CLINIC | Age: 24
End: 2019-02-26

## 2019-02-26 DIAGNOSIS — Z30.431 ENCOUNTER FOR ROUTINE CHECKING OF INTRAUTERINE CONTRACEPTIVE DEVICE (IUD): ICD-10-CM

## 2019-02-26 ASSESSMENT — MIFFLIN-ST. JEOR: SCORE: 1220.35

## 2019-02-28 ENCOUNTER — AMBULATORY - HEALTHEAST (OUTPATIENT)
Dept: FAMILY MEDICINE | Facility: CLINIC | Age: 24
End: 2019-02-28

## 2019-02-28 ENCOUNTER — COMMUNICATION - HEALTHEAST (OUTPATIENT)
Dept: FAMILY MEDICINE | Facility: CLINIC | Age: 24
End: 2019-02-28

## 2019-02-28 LAB
C TRACH DNA SPEC QL PROBE+SIG AMP: POSITIVE
N GONORRHOEA DNA SPEC QL NAA+PROBE: NEGATIVE

## 2020-05-26 ENCOUNTER — COMMUNICATION - HEALTHEAST (OUTPATIENT)
Dept: OBGYN | Facility: HOSPITAL | Age: 25
End: 2020-05-26

## 2020-05-26 DIAGNOSIS — F33.1 MODERATE EPISODE OF RECURRENT MAJOR DEPRESSIVE DISORDER (H): ICD-10-CM

## 2020-06-27 ENCOUNTER — COMMUNICATION - HEALTHEAST (OUTPATIENT)
Dept: FAMILY MEDICINE | Facility: CLINIC | Age: 25
End: 2020-06-27

## 2020-06-27 DIAGNOSIS — F33.1 MODERATE EPISODE OF RECURRENT MAJOR DEPRESSIVE DISORDER (H): ICD-10-CM

## 2020-07-02 ENCOUNTER — COMMUNICATION - HEALTHEAST (OUTPATIENT)
Dept: FAMILY MEDICINE | Facility: CLINIC | Age: 25
End: 2020-07-02

## 2020-07-02 DIAGNOSIS — F33.1 MODERATE EPISODE OF RECURRENT MAJOR DEPRESSIVE DISORDER (H): ICD-10-CM

## 2020-12-14 ENCOUNTER — AMBULATORY - HEALTHEAST (OUTPATIENT)
Dept: FAMILY MEDICINE | Facility: CLINIC | Age: 25
End: 2020-12-14

## 2020-12-14 DIAGNOSIS — F33.1 MODERATE EPISODE OF RECURRENT MAJOR DEPRESSIVE DISORDER (H): ICD-10-CM

## 2021-03-23 ENCOUNTER — COMMUNICATION - HEALTHEAST (OUTPATIENT)
Dept: FAMILY MEDICINE | Facility: CLINIC | Age: 26
End: 2021-03-23

## 2021-03-23 DIAGNOSIS — F33.1 MODERATE EPISODE OF RECURRENT MAJOR DEPRESSIVE DISORDER (H): ICD-10-CM

## 2021-05-30 VITALS — WEIGHT: 115 LBS | BODY MASS INDEX: 21.03 KG/M2

## 2021-05-31 VITALS — BODY MASS INDEX: 19.57 KG/M2 | WEIGHT: 107 LBS

## 2021-06-01 VITALS — BODY MASS INDEX: 20.67 KG/M2 | WEIGHT: 113 LBS

## 2021-06-01 VITALS — WEIGHT: 121.9 LBS | BODY MASS INDEX: 22.3 KG/M2

## 2021-06-01 VITALS — WEIGHT: 132 LBS | BODY MASS INDEX: 24.14 KG/M2

## 2021-06-01 VITALS — WEIGHT: 138.06 LBS | BODY MASS INDEX: 25.25 KG/M2

## 2021-06-02 VITALS — BODY MASS INDEX: 20.98 KG/M2 | HEIGHT: 62 IN | WEIGHT: 114 LBS

## 2021-06-02 VITALS — BODY MASS INDEX: 25.97 KG/M2 | WEIGHT: 142 LBS

## 2021-06-02 VITALS — WEIGHT: 145 LBS | BODY MASS INDEX: 26.52 KG/M2

## 2021-06-02 VITALS — BODY MASS INDEX: 25.06 KG/M2 | WEIGHT: 137 LBS

## 2021-06-02 VITALS — BODY MASS INDEX: 26.52 KG/M2 | WEIGHT: 145 LBS

## 2021-06-02 VITALS — WEIGHT: 147 LBS | BODY MASS INDEX: 26.89 KG/M2

## 2021-06-02 VITALS — HEIGHT: 62 IN | BODY MASS INDEX: 21.71 KG/M2 | WEIGHT: 118 LBS

## 2021-06-02 VITALS — WEIGHT: 142 LBS | BODY MASS INDEX: 25.97 KG/M2

## 2021-06-02 VITALS — WEIGHT: 118.2 LBS | BODY MASS INDEX: 21.62 KG/M2

## 2021-06-08 NOTE — TELEPHONE ENCOUNTER
RN cannot approve Refill Request    RN can NOT refill this medication Protocol failed and NO refill given.       Starla Bray, [facility] Triage/Med Refill 5/28/2020    Requested Prescriptions   Pending Prescriptions Disp Refills     sertraline (ZOLOFT) 20 mg/mL concentrated solution 75 mL 0     Sig: Take 2.5 mL (50 mg total) by mouth at bedtime. Pt reports taking only 50 mg at home. Was not taking 100 mg at home.       SSRI Refill Protocol  Failed - 5/26/2020  4:22 PM        Failed - PCP or prescribing provider visit in last year     Last office visit with prescriber/PCP: Visit date not found OR same dept: Visit date not found OR same specialty: Visit date not found  Last physical: Visit date not found Last MTM visit: Visit date not found   Next visit within 3 mo: Visit date not found  Next physical within 3 mo: Visit date not found  Prescriber OR PCP: Tonya Cummings MD  Last diagnosis associated with med order: There are no diagnoses linked to this encounter.  If protocol passes may refill for 12 months if within 3 months of last provider visit (or a total of 15 months).

## 2021-06-08 NOTE — PROGRESS NOTES
Assessment/Plan:        Diagnoses and all orders for this visit:    Screen for STD (sexually transmitted disease)  -     Chlamydia trachomatis & Neisseria gonorrhoeae, Amplified Detection  She declined screening for HIV and hepatitis C.  She is immunized against hepatitis B.  She has a history of cold sores but has not had any recently.    Anxiety  Currently well controlled on the sertraline at 50 mg daily.  She has gone through counseling for her fetal demise ×2 at 8 months.  Both with trisomy 18.  She states she feels like she is doing fine with this now and does not wish to seek any other counseling currently.    Other orders  -     levonorgestrel-ethinyl estradiol (AVIANE,ALESSE,LESSINA) 0.1-20 mg-mcg per tablet; Take 1 tablet by mouth daily.  Dispense: 84 tablet; Refill: 4  She does want to get started on birth control.  We'll start with Aviane        Subjective:    Patient ID: Hannah Severino is a 21 y.o. female.    HPI Comments: 21-year-old female presents today requesting gonorrhea and chlamydia screening.  She states that she is not having any symptoms but that her last partner was having other partners so she just like to be screened for gonorrhea and chlamydia.  I offered screening for hepatitis C and a JULITO and she declines.  She is immunized against hepatitis B.  She is not having any vaginal discharge or pelvic pain.    She is using sertraline for her anxiety.  It is working well for her.  She lost her second baby to trisomy 18 this past summer.  She's had 2 total pregnancies both lost around 8-9 months with trisomy 18.  She states that she had genetic screening done and they didn't find any translocations for her.  They associated with her partners.  She is not desiring any pregnancies currently and is hoping to get into nursing school.      The following portions of the patient's history were reviewed and updated as appropriate: allergies, current medications, past family history, past medical  history, past social history and problem list.    Review of Systems   Constitutional: Negative for chills and fever.   Respiratory: Negative for shortness of breath.    Cardiovascular: Negative for chest pain.   Genitourinary: Negative for pelvic pain, vaginal discharge and vaginal pain.             Objective:    Physical Exam   Constitutional: She is oriented to person, place, and time. She appears well-nourished.   Cardiovascular: Normal rate and regular rhythm.    Neurological: She is alert and oriented to person, place, and time.

## 2021-06-15 NOTE — PROGRESS NOTES
ASSESSMENT/PLAN:  1. Major depressive disorder with current active episode  Major depression previously treated well with sertraline.  She is now off the medication for several months.  We will restart sertraline.  Will do the solution that she is unable to swallow pills.  I am also recommending referral to psychology.  I will see her back in 4-6 weeks.  - Ambulatory referral to Psychology  - sertraline (ZOLOFT) 20 mg/mL concentrated solution; Take 2.5 mL (50 mg total) by mouth daily.  Dispense: 80 mL; Refill: 3    2. Routine health maintenance  She just turned 22 and is due for screening Pap smear, this is completed today.  - Gynecologic Cytology (PAP Smear)    3. History of chlamydia  She has a history of being treated for chlamydia.  She is now with a different partner and she did not get rechecked after her treatment course.  - Chlamydia trachomatis & Neisseria gonorrhoeae, Amplified Detection        There are no Patient Instructions on file for this visit.    Orders Placed This Encounter   Procedures     Chlamydia trachomatis & Neisseria gonorrhoeae, Amplified Detection     Order Specific Question:   Specimen Source?     Answer:   Endocervix     Ambulatory referral to Psychology     Referral Priority:   Routine     Referral Type:   Behavioral Health     Referral Reason:   Evaluation and Treatment     Requested Specialty:   Psychology     Number of Visits Requested:   1     Medications Discontinued During This Encounter   Medication Reason     sertraline (ZOLOFT) 20 mg/mL concentrated solution Reorder     levonorgestrel-ethinyl estradiol (AVIANE,ALESSE,LESSINA) 0.1-20 mg-mcg per tablet      sertraline (ZOLOFT) 20 mg/mL concentrated solution Reorder       No Follow-up on file.    CHIEF COMPLAINT;  Chief Complaint   Patient presents with     Follow-up     depression, recheck for chlamydia       HISTORY OF PRESENT ILLNESS:  Hannah is a 22 y.o. female presenting to the clinic today for a follow up regarding  depression and chlamydia.    Depression: She was seen by a therapist and diagnosed with major depression. She was started on sertraline 50 mg daily. She did try to refill the prescription through the clinic but was unable to as she did not have an office visit recently. She states that since being off of the medication, she has increased nightmares, irritability, mood swings, and sleep disturbances. She felt she may have had bipolar depression as she had more mood swings, but she states her therapist did not feel that her symptoms fit with that diagnosis. She does not have a therapist her.     Health Maintenance: Her PAP smear was completed today.    REVIEW OF SYSTEMS:  She is sexually active. She is no longer on birth control medication. She uses condoms as primary birth control. All other systems are negative.     PFSH:  She has lost a child. She is currently in a relationship. She is currently living with her mom. 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 Reviewed, as below.    TOBACCO USE:  History   Smoking Status     Current Every Day Smoker   Smokeless Tobacco     Never Used       VITALS:  Vitals:    02/05/18 1447   BP: 100/70   Pulse: 64   Resp: 16   Weight: 107 lb (48.5 kg)     Wt Readings from Last 3 Encounters:   02/05/18 107 lb (48.5 kg)   02/13/17 115 lb (52.2 kg)   09/10/15 123 lb 12.8 oz (56.2 kg) (44 %, Z= -0.15)*     * Growth percentiles are based on [facility] 2-20 Years data.     Body mass index is 19.57 kg/(m^2).    PHYSICAL EXAM:  GENERAL APPEARANCE: Alert, cooperative, no distress, appears stated age  HEAD: Normocephalic, without obvious abnormality, atraumatic  PELVIC: Normally developed genitalia with no external lesions or eruptions. Vagina and cervix show no lesions.. No cystocele, No rectocele. Uterus normal.  No adnexal mass or tenderness.  NEUROLOGIC: CNII-XII intact.     RECENT RESULTS  No results found for this or any previous visit (from the past 48  hour(s)).    QUALITY MEASURES:  The following are part of a depression follow up plan for the patient:  seen by mental health counselor and patient follow-up to return when and if necessary    ADDITIONAL HISTORY SUMMARIZED (2): None.  DECISION TO OBTAIN EXTRA INFORMATION (1): None.  RADIOLOGY TESTS (1): None.  LABS (1): Labs ordered today.  MEDICINE TESTS (1): None.  INDEPENDENT REVIEW (2 each): None.    The visit lasted a total of 13 minutes face to face with the patient. Over 50% of the time was spent counseling and educating the patient about depression and general health maintenance.    IRachel, am scribing for and in the presence of, Dr. Osborne.    I, Dr. Osborne, personally performed the services described in this documentation, as scribed by Rachel Gage in my presence, and it is both accurate and complete.    MEDICATIONS:  Current Outpatient Prescriptions   Medication Sig Dispense Refill     azithromycin (ZITHROMAX) 250 MG tablet Take 4 tablets po once 4 tablet 0     sertraline (ZOLOFT) 20 mg/mL concentrated solution Take 2.5 mL (50 mg total) by mouth daily. 80 mL 3     No current facility-administered medications for this visit.        Total data points: 1

## 2021-06-16 NOTE — PROGRESS NOTES
ASSESSMENT/PLAN:  1. Major depressive disorder with current active episode  Depression is now controlled using the sertraline at 50 mg daily.  She uses the solution as she is unable to swallow pills.  Will continue with this dose.    2. Birth control counseling  We discussed using the Depo-Provera for birth control however her urine pregnancy test came back positive.  She will be returning for beta-hCG levels and further discussions.  Of note she has a history of having 2 pregnancies with trisomy 18.  - Pregnancy, Urine        Patient Instructions   Return to clinic in 2 weeks for a Nurse Only visit to receive the Depo shot.    Return in 3 months or sooner as needed for a follow up regarding your depression.      Orders Placed This Encounter   Procedures     Pregnancy, Urine     Medications Discontinued During This Encounter   Medication Reason     azithromycin (ZITHROMAX) 250 MG tablet Therapy completed       No Follow-up on file.    CHIEF COMPLAINT;  Chief Complaint   Patient presents with     Follow-up     depression and has some questions       HISTORY OF PRESENT ILLNESS:  Hannah is a 22 y.o. female presenting to the clinic today for a follow up regarding depression.    Depression: She continues on sertraline 50 mg daily. She reports no adverse side effects. She has noticed that she has more of an appetite and does eat more since starting sertraline. Previously, she did not have an appetite. She has not had sleep paralysis or anger issues at work since starting sertraline. She states that her boss does notice a difference in her with the medication. She feels that this dose is working for her and she does not wish to increase the dose at this time.    Loose Stools: She has concerns of IBS. She states that she does not feel that she has to have a bowel movement, but all of a sudden the urge is there. She states that she has been experiencing this for quite a long time. She has 2-3 bowel movements per day. Her  stools are always loose. She denies blood in her stools. She does not believe there are certain foods that set her symptoms off. She feels that her stools may have gotten more loose since starting sertraline. She does not think there is a family history of inflammatory bowel disease. She thinks there may be a family history of Rheumatoid arthritis.     Contraception: She would like to start contraception. She did have some difficulty remembering to take the pill every day previously. She is thinking about the Depo shot, but is a little concerned about weight gain. She has questions regarding fertility after contraception. She does not wish to have an implant.     Bacterial Vaginosis: She is wondering if there is a way to get rid of bacterial vaginosis. She was previously prescribed a cream for this. She is wondering if diet has an effect on this.     REVIEW OF SYSTEMS:  She was 107 pounds at her last visit 2/5/18. She is wondering if this weight is of concern. Of note, she has gained 6 pounds since that visit. All other systems are negative.    PFSH:  Reviewed, as below.    TOBACCO USE:  History   Smoking Status     Current Every Day Smoker   Smokeless Tobacco     Never Used       VITALS:  Vitals:    03/12/18 1424   BP: 106/60   Pulse: 62   Resp: 16   Weight: 113 lb (51.3 kg)     Wt Readings from Last 3 Encounters:   03/12/18 113 lb (51.3 kg)   02/05/18 107 lb (48.5 kg)   02/13/17 115 lb (52.2 kg)     Body mass index is 20.67 kg/(m^2).    PHYSICAL EXAM:  GENERAL APPEARANCE: Alert, cooperative, no distress, appears stated age  HEAD: Normocephalic, without obvious abnormality, atraumatic  EYES:  PERRL, conjunctiva/corneas clear, EOM's intact, fundi     benign, both eyes       EARS: Normal TM's and external ear canals, both ears  NOSE:  Nares normal, septum midline, mucosa normal, no drainage or sinus tenderness  THROAT: Lips, mucosa, and tongue normal; teeth and gums normal  NECK: Supple, symmetrical, trachea midline,  no adenopathy;    thyroid:  No enlargement/tenderness/nodules; no carotid    bruit or JVD  BACK: Symmetric, no curvature, ROM normal, no CVA tenderness  LUNGS: Clear to auscultation bilaterally, respirations unlabored  CHEST WALL: No tenderness or deformity  HEART: Regular rate and rhythm, S1 and S2 normal, no murmur, rub or gallop  ABDOMEN: Soft, non-tender, bowel sounds active all four quadrants,     no masses, no organomegaly  EXTREMITIES: Extremities normal, atraumatic, no cyanosis or edema  PULSES: 2+ and symmetric all extremities  SKIN: Skin color, texture, turgor normal, no rashes or lesions  LYMPH NODES: Cervical, supraclavicular, and axillary nodes normal  NEUROLOGIC: CNII-XII intact. Normal strength, sensation and reflexes       throughout    RECENT RESULTS  Recent Results (from the past 48 hour(s))   Pregnancy, Urine    Collection Time: 03/12/18  2:48 PM   Result Value Ref Range    Pregnancy Test, Urine Positive (!) Negative       QUALITY MEASURES:  The following are part of a depression follow up plan for the patient:  patient follow-up to return when and if necessary    ADDITIONAL HISTORY SUMMARIZED (2): None.  DECISION TO OBTAIN EXTRA INFORMATION (1): None.  RADIOLOGY TESTS (1): None.  LABS (1): Labs 2/5/18 reviewed, PAP normal. Labs ordered today.  MEDICINE TESTS (1): None.  INDEPENDENT REVIEW (2 each): None.    The visit lasted a total of 13 minutes face to face with the patient. Over 50% of the time was spent counseling and educating the patient about depression, loose stools, contraception, and bacterial vaginosis.    IRachel, am scribing for and in the presence of, Dr. Osborne.    I, Dr. Osborne, personally performed the services described in this documentation, as scribed by Rachel Gage in my presence, and it is both accurate and complete.    MEDICATIONS:  Current Outpatient Prescriptions   Medication Sig Dispense Refill     sertraline (ZOLOFT) 20 mg/mL concentrated solution Take 2.5  mL (50 mg total) by mouth daily. 80 mL 3     No current facility-administered medications for this visit.        Total data points: 1

## 2021-06-16 NOTE — PROGRESS NOTES
ASSESSMENT/PLAN:  1. Amenorrhea  22-year-old female presenting for amenorrhea with positive urine pregnancy test.  She is a .  She had a fetal demise at 38 weeks which was a known trisomy 18 and a miscarriage in second trimester also known to be trisomy 18.  I will obtain her genetic testing done in [place] and make decisions about whether there would be benefit to her again seeing a genetic counselor.  We will plan to do Progenity testing at 10 weeks.  She is planning on keeping the pregnancy.  We will do prenatal labs and Bhcg to help with dating today.  She has a history of having chlamydia and does need retesting but is unable to urinate today.  Follow-up in 1 month, sooner with any bleeding or any further questions. We will make recommendations on when to do US based on [facility] results.  Continue with sertraline and start a prenatal vitamin.  - Beta-hCG, Quantitative  - Prenatal Screen Cascade and Hemogram  - HIV Antigen/Antibody Screening Loco  - Syphilis Screen, Cascade  - HM1 (CBC with Diff)  - Rubella Antibody, IgG  - Hepatitis B Surface Antigen (HBsAG)  - HML ABO/Rh Typing  - HML Antibody Screen        There are no Patient Instructions on file for this visit.    Orders Placed This Encounter   Procedures     Beta-hCG, Quantitative     HIV Antigen/Antibody Screening Loco     Syphilis Screen, Cascade     HM1 (CBC with Diff)     Rubella Antibody, IgG     Hepatitis B Surface Antigen (HBsAG)     HML ABO/Rh Typing     HML Antibody Screen     There are no discontinued medications.    No Follow-up on file.    CHIEF COMPLAINT;  Chief Complaint   Patient presents with     Follow-up     labs       HISTORY OF PRESENT ILLNESS:  Hannah is a 22 y.o. female presenting to the clinic today for a follow up regarding labs and pregnancy.     Pregnancy: She is no longer with her ex-boyfriend. She is not for sure who the father of the baby would be. The man that she is seeing said that he would be there for her and the  baby regardless of if he was the father. Of note, she has a history of fetal demise x2 at 8 months; both with trisomy 18. Given her history, prior to being pregnant, she considered  with any potential pregnancy. She feels that she could not go through with an  now that she is pregnant. She is planning to continue with this pregnancy. She has previously had genetic testing and was told her that she did not have chromosomal abnormalities. She has not met with a , the genetic testing was only done through the lab. She is not currently taking a prenatal vitamin. She does not recall when her last menstrual period was; she feels it may have been mid-February. She thinks this last menstrual period was lighter and shorter than usual.     REVIEW OF SYSTEMS:  All other systems are negative.    PFSH:  She has a history of fetal demise x2 at 8 months; both with trisomy 18. Reviewed, as below.    TOBACCO USE:  History   Smoking Status     Current Every Day Smoker   Smokeless Tobacco     Never Used       VITALS:  Vitals:    18 0858   BP: 100/62   Pulse: 62   Weight: 113 lb (51.3 kg)     Wt Readings from Last 3 Encounters:   18 113 lb (51.3 kg)   18 113 lb (51.3 kg)   18 107 lb (48.5 kg)     Body mass index is 20.67 kg/(m^2).    PHYSICAL EXAM:  GENERAL APPEARANCE: Alert, cooperative, no distress, appears stated age  HEAD: Normocephalic, without obvious abnormality, atraumatic  NEUROLOGIC: CNII-XII intact.     RECENT RESULTS  Recent Results (from the past 48 hour(s))   Pregnancy, Urine    Collection Time: 18  2:48 PM   Result Value Ref Range    Pregnancy Test, Urine Positive (!) Negative       ADDITIONAL HISTORY SUMMARIZED (2): None.  DECISION TO OBTAIN EXTRA INFORMATION (1): Obtaining patient's previous genetic screening note and results, Dr. Patel in [place] with [facility].   RADIOLOGY TESTS (1): None.  LABS (1): Labs reviewed, 3/12/18, Pregnancy positive. Labs  ordered today.   MEDICINE TESTS (1): None.  INDEPENDENT REVIEW (2 each): None.    The visit lasted a total of 12 minutes face to face with the patient. Over 50% of the time was spent counseling and educating the patient about pregnancy.    I, Rachel Gage, am scribing for and in the presence of, Dr. Osborne.    I, Dr. Osborne, personally performed the services described in this documentation, as scribed by Racehl Gage in my presence, and it is both accurate and complete.    MEDICATIONS:  Current Outpatient Prescriptions   Medication Sig Dispense Refill     sertraline (ZOLOFT) 20 mg/mL concentrated solution Take 2.5 mL (50 mg total) by mouth daily. 80 mL 3     No current facility-administered medications for this visit.        Total data points: 2

## 2021-06-19 NOTE — PROGRESS NOTES
She notes good fetal movement. She has an ultrasound on Friday with [facility]. She denies cramping, bleeding, or fluid leakage. She endorses increased discharge. She is unsure if this discharge is related to perineal cuts that she has previously had. She endorses some perineal itching and discomfort. She is able to keep up at work. She does get hungry often and eats quite a bit, and feels that her 11 pound weight gain is accurate. She states that she wakes up with low back pain that she describes as a deep aching. She endorses palpitations. She is wondering if her positive chlamydia could be recurrence from when she had it previously. She has not had any new partners. She was treated with 1 gram of azithromycin. She has tried to contact the two men that are potential fathers, but they have not contacted her back. Labs ordered today. 1 hr GTT will be done at next visit.  I suspect she has a vaginal external yeast.  Discussed possibility of using Monistat.  Will still awaiting her wet prep.  With regards to her left flank pain, recommend she continue to monitor and will check a UA today.  If symptoms are worsening or she develops any nausea she needs to be seen immediately for reevaluation.  I, Rachel Gage, am scribing for and in the presence of, Dr. Osborne.    I, Dr. Osborne, personally performed the services described in this documentation, as scribed by Rachel Gage in my presence, and it is both accurate and complete.

## 2021-06-19 NOTE — PROGRESS NOTES
PRENATAL VISIT   FIRST OBSTETRICAL EXAM - OB    Assessment / Impression     23-year-old  at 20 weeks and 6 days gestation.  She has a history of 2 prior pregnancies with trisomy 18.  The first ending in first trimester and the second and during an IUFD in third trimester.  There is concern initially with this pregnancy that there is also trisomy 18.  She has been extensively evaluated including with amniocentesis at [facility] and they do not suspect that that is the case with this pregnancy.  Her initial mental DNA testing to come back positive but follow-up testing was negative.  She will have a follow-up ultrasound in 3 weeks with perinatology.  She is otherwise doing well.  Her support is her mother immediate family.  Father of the baby is in question and neither are involved at this point in time.  We will continue to see her regularly.  She is educated on second trimester cares and will follow-up sooner with any questions or concerns.    She did note some palpitations and I am referring her for a event monitor for further evaluation.  Normal first prenatal visit at 20 weeks 6 days gestation.  Discussed orientation, general information, lifestyle, nutrition, exercise, warning signs, resources, lab testing, risk screening and discussed cystic fibrosis screening with patient.  Questions answered.    Plan:     Event monitor for palpitations   Initial labs drawn.  Prenatal vitamins.  Problem list reviewed and updated.  Genetic screening test options discussed:  already done.  Role of ultrasound in pregnancy discussed; fetal survey: done.  Follow up: No Follow-up on file.      Subjective:    Hannah Severino is a 23 y.o.  here today for her First Obstetrical Exam. This is her third pregnancy. She had a stillbirth in 2015 and a miscarriage in 2016, both with confirmed Trisomy 18. She is confused why the blood test came back positive for trisomy 18. She is happy that she did not go through with  the  the planned on doing when she initially found out she was pregnant. She is feeling well. She is feeling fetal movement. She is having a girl. She is planning to name the baby Ana Paula. She is not waking up much overnight. She is on her feet a lot at work. She is starting to eat more. She is having some vaginal discomfort. She denies vaginal bleeding. She does not have a significant other. Both of the men that could be the father of the baby have decided to leave and not support her. The first man she was with is having 3 other children right now as well. The second man she was with has removed himself from the picture completely. She feels conflicted because she wants to know who the father of the baby is, but she does not trust the first man's current fiance. She is currently living with her mother. She wants to breastfeed for 1 year. She would like to deliver [facility].    Constipation: She was constipated recently. She did have a bowel movement yesterday after not having one for 4-5 days. Her bowel movement was very hard and difficult for her. She did have some bleeding, but noted that it was from her rectum, not vaginally.    Palpitations: She has been noticing her heart racing more. She feels this is similar to when she had an anxiety attack during her first pregnancy. She notices this more when she wakes up. She occasionally feels nauseous or like she will pass out. This happens a couple of times per week.     Depression: She has not been taking her sertraline. She is worried to take it because when she was previously started on it during pregnancy, she lost the fetus shortly after. She feels her mood has been stable. She is experiencing some stress with her first boyfriend's current fiance.       OB History    Para Term  AB Living   3 1 1  1 0   SAB TAB Ectopic Multiple Live Births             # Outcome Date GA Lbr Darren/2nd Weight Sex Delivery Anes PTL Lv   3 Current             2 AB 07/18/16     SAB      1 Term 08/26/15 37w3d 00:26 / 00:08  F Vag-Spont None N FD          Expected Date of Delivery: 11/20/18    Past Medical History:   Diagnosis Date     Finger Injury      Varicella     3 yo     History reviewed. No pertinent surgical history.  Social History   Substance Use Topics     Smoking status: Current Every Day Smoker     Smokeless tobacco: Never Used     Alcohol use No     No current outpatient prescriptions on file.     No current facility-administered medications for this visit.      No Known Allergies     High Risk Behavior: Currently unmarried and Previous stillbirth, history of fetal demise x2 d/t Trisomy 18.    Review of Systems  General:  Denies problem  Eyes: Denies problem  Ears/Nose/Throat: Denies problem  Cardiovascular: Denies problem  Respiratory:  Denies problem  Gastrointestinal:  Denies problem, Genitourinary: Denies problem  Musculoskeletal:  Denies problem  Skin: Positive for cold sore near mouth  Neurologic: Denies problem  Psychiatric: Denies problem  Endocrine: Denies problem  Heme/Lymphatic: Denies problem   Allergic/Immunologic: Denies problem       Objective:   Objective    Vitals:    07/09/18 1602   BP: 90/53   Weight: 121 lb 14.4 oz (55.3 kg)     Physical Exam:  General Appearance: Alert, cooperative, no distress, appears stated age  Head: Normocephalic, without obvious abnormality, atraumatic  Eyes: PERRL, conjunctiva/corneas clear, EOM's intact  Ears: Normal TM's and external ear canals, both ears  Nose: Nares normal, septum midline,mucosa normal, no drainage  Throat: Lips, mucosa, and tongue normal; teeth and gums normal  Neck: Supple, symmetrical, trachea midline, no adenopathy;  thyroid: not enlarged, symmetric, no tenderness/mass/nodules; no carotid bruit or JVD  Back: Symmetric, no curvature, ROM normal, no CVA tenderness  Lungs: Clear to auscultation bilaterally, respirations unlabored  Breasts: No breast masses, tenderness, asymmetry, or nipple  discharge.  Heart: Regular rate and rhythm, S1 and S2 normal, no murmur, rub, or gallop  Abdomen: Soft, non-tender, bowel sounds active all four quadrants,  no masses, no organomegaly  Pelvic:Normally developed genitalia with no external lesions or eruptions. Vagina and cervix show no lesions, inflammation, discharge or tenderness. No cystocele, No rectocele. Uterus normal.  No adnexal mass or tenderness.  Extremities: Extremities normal, atraumatic, no cyanosis or edema  Skin: Skin color, texture, turgor normal, no rashes or lesions  Lymph nodes: Cervical, supraclavicular, and axillary nodes normal  Neurologic: Normal     Lab:   Results for orders placed or performed in visit on 07/09/18   Urinalysis Macroscopic   Result Value Ref Range    Color, UA Yellow Colorless, Yellow, Straw, Light Yellow    Clarity, UA Clear Clear    Glucose, UA Negative Negative    Bilirubin, UA Negative Negative    Ketones, UA Negative Negative    Specific Gravity, UA 1.010 1.005 - 1.030    Blood, UA Negative Negative    pH, UA 6.0 5.0 - 8.0    Protein, UA Negative Negative mg/dL    Urobilinogen, UA 0.2 E.U./dL 0.2 E.U./dL, 1.0 E.U./dL    Nitrite, UA Negative Negative    Leukocytes, UA Negative Negative       ADDITIONAL HISTORY SUMMARIZED (2): Jenna note 6/22/18 reviewed, genetic counseling, arrange for amniocentesis.  DECISION TO OBTAIN EXTRA INFORMATION (1): Care Everywhere accessed.   RADIOLOGY TESTS (1): None.  LABS (1): Labs ordered today.  MEDICINE TESTS (1): Holter monitor ordered.  INDEPENDENT REVIEW (2 each): None.     The visit lasted a total of 28 minutes face to face with the patient. Over 50% of the time was spent counseling and educating the patient about pregnancy.    I, Rachel Gage, am scribing for and in the presence of, Dr. Osborne.    I, Dr. Osborne, personally performed the services described in this documentation, as scribed by Rachel Gage in my presence, and it is both accurate and complete.    Dragon dictation  was used for this note.  Speech recognition errors are a possibility.    Total Data Points: 5

## 2021-06-20 NOTE — PROGRESS NOTES
Written and verbal d/c instructions given re: warning signals in pregnancy, signs of labor, to call provider with questions or concerns, to use Tylenol and heat for comfort and to keep OB appt scheduled for Monday. Pt verbalized understanding of d/c instructions and amb from unit at this time in stable, undelivered condition. Accompanied by mother and belongings..Dora Judge

## 2021-06-20 NOTE — PROGRESS NOTES
Dr. Osborne notified via phone of lab results and US results, pt pain the same and cervix was closed with sve. MD ordered Tylenol 650mg po and Vistaril 100mg PO at this time. If pain lessens (does not need to be gone) then may d/c to home Dora Judge

## 2021-06-20 NOTE — PROGRESS NOTES
23 y.o.  at 30 weeks and 6 days gestation. She has been feeling well. She notes good fetal movement. She denies cramping or bleeding. She continues on 50 mg sertraline daily and is tolerating this well. She feels this has worked well to manage her depressive symptoms. She has not had any outbursts or negative conversations. She does occasionally still feel like she does not want people to look at her, but she feels this is mild and is normal. She is having [place] contractions. She tries to keep up at work. She is a little nervous for labor. She does not have any questions or concerns at this time.     I, Rachel Gage, am scribing for and in the presence of, Dr. Osborne.    I, Dr. Osborne, personally performed the services described in this documentation, as scribed by Rachel Gage in my presence, and it is both accurate and complete.

## 2021-06-20 NOTE — PROGRESS NOTES
22 y/o  at 34.2 weeks gest with edc 18 presents to [facility] with reports of pain to abdomen that started this am. Rates pain with moving as 8-9/10 and cannot stand up straight. Rates pain at rest as 2/10. Pain is constant across abdomen. Denies SROM and vag bleeding. +FM. cxns noted on monitor. Also reports having 3 BMs today and that is not normal for her. Denies diarrhea and constipation although does have constipation at times. Takes Fe and Sertaline. NKDA. Hx of 37 bweeks stillbirth that she was induced for and an 8 week SAB.   Monitors applied and POC discussed. Oriented to room and cares. Mother here with her. Water given and urine sent for UA/UC.   Pt was seen at a hospital in [place] where she was monitored, cervix checked (closed) and then discharged. Called RN Charge here and was directed to come in as she continues with pain and concerns.Dora Judge

## 2021-06-20 NOTE — PROGRESS NOTES
Pt notified of orders by dr. Harris. Pt states she cannot take pills. Asked if she can go home and take her liquid Tylenol at home. Notified MD of pt request as well as cervix rechecked and no cervical change or bloody show. MD gave orders to d/c to home with routine instructions.Dora Judge

## 2021-06-20 NOTE — PROGRESS NOTES
23-year-old  at 29 weeks 0 days gestation. She notes good fetal movement. She has noticed increased abdominal pain when she is laying on her left side. She has been having angry outbursts over small daily things. She feels her depression is worsening with body changes. She is not sure if she wants the pregnancy to be over or if she is bothered by getting attention from being pregnant. she notes that the thought of being alone is also making her more depressed. Her mother seems uninterested when she talks about the baby's movement or other things about the baby. She is unsure if she is talking about the baby too much or if she is being oversensitive to how she is perceiving interactions with others. She does not have many friends that she can talk to as most of her previous friends were heavy drinkers. She thinks she would like to start medication again. PHQ-9 is done today.   1 hr GTT, hemoglobin, and syphilis labs done today. Ultrasound ordered for recheck of growth and polyhydramnios. She will start sertraline 25 mg daily for a couple of days and increase to 50 mg daily.    I, Rachel Gage, am scribing for and in the presence of, Dr. Osborne.    I, Dr. Osborne, personally performed the services described in this documentation, as scribed by Rachel Gage in my presence, and it is both accurate and complete.

## 2021-06-20 NOTE — PROGRESS NOTES
Called US to inquire about time for pt to be scanned. Will be soon. Tech will call when ready for pt.Dora Judge

## 2021-06-21 NOTE — ANESTHESIA POSTPROCEDURE EVALUATION
Patient: Hannah Severino  * No procedures listed *  Anesthesia type: epidural    Patient location: Labor and Delivery  Last vitals:   Vitals:    11/21/18 0100   BP: 113/63   Pulse: 80   Resp: 16   Temp: 36.8  C (98.2  F)   SpO2: 98%     Post vital signs: stable  Level of consciousness: awake and responds to simple questions  Post-anesthesia pain: pain controlled  Post-anesthesia nausea and vomiting: no  Pulmonary: unassisted  Cardiovascular: stable  Hydration: adequate  Anesthetic events: no    QCDR Measures:  ASA# 11 - Carol-op Cardiac Arrest: ASA11B - Patient did NOT experience unanticipated cardiac arrest  ASA# 12 - Carol-op Mortality Rate: ASA12B - Patient did NOT die  ASA# 13 - PACU Re-Intubation Rate: NA - No ETT / LMA used for case  ASA# 10 - Composite Anes Safety: ASA10A - No serious adverse event    Additional Notes:

## 2021-06-21 NOTE — PROGRESS NOTES
23 y.o.  at 38 weeks and 6 days gestation. She has had increased nausea over the past week. She feels she has also had increased palpitations. She feels worried that she does not feel as much fetal movement. She does still feel fetal movement daily. Her mother will be out of town for Thanksgiving and she is wondering what will happen if she goes past her due date. She is quite worried about her mother not being present for delivery. One of the two potential fathers of the baby would like DNA testing and would like to be present for delivery. She is planning to deliver at [facility]. She does have the number for labor and delivery. She does not have any questions or concerns at this time.  Discussed labor and delivery. Reviewed 5-1-1 rule. Consider induction next week. Advised pre-registration at [facility]. Follow up in 1 week.  We discussed induction at 39-6/7 with history of intrauterine fetal demise.  She is in agreement with plan.  I will see her next week and she is currently scheduled for induction with Cytotec the evening of  to be followed by Pitocin on .  I, Rachel Gage, am scribing for and in the presence of, Dr. Osborne.    I, Dr. Osborne, personally performed the services described in this documentation, as scribed by Rachel Gage in my presence, and it is both accurate and complete.

## 2021-06-21 NOTE — ANESTHESIA PREPROCEDURE EVALUATION
Anesthesia Evaluation      Patient summary reviewed   No history of anesthetic complications     Airway    Pulmonary - negative ROS                          Cardiovascular - negative ROS  Exercise tolerance: > or = 4 METS   Neuro/Psych - negative ROS     Endo/Other    (+) pregnant     GI/Hepatic/Renal - negative ROS           Dental                         Anesthesia Plan  Planned anesthetic: epidural    ASA 2     Anesthetic plan and risks discussed with: patient    Post-op plan: routine recovery        Isha Holt MD  Staff Anesthesiologist  [facility]  11/20/18

## 2021-06-21 NOTE — PROGRESS NOTES
23 y.o.  at 39 weeks and 6 days gestation. She notes good fetal movement. She is nervous for induction. She is not having any contractions. She endorses intermittent mild headaches over the past week. Her sertraline is working well for her and she does plan to continue this postpartum. She would like to have the Cytotec and Pitocin administered vaginally. She does not have any questions or concerns at this time.   Planned induction tonight, discussed the risks and benefits.     IRachel, am scribing for and in the presence of, Dr. Osborne.    I, Dr. Osborne, personally performed the services described in this documentation, as scribed by Rachel Gage in my presence, and it is both accurate and complete.

## 2021-06-21 NOTE — ANESTHESIA PROCEDURE NOTES
Epidural Block    Patient location during procedure: OB    Reason for Block:labor epidural  Staffing:  Performing  Anesthesiologist: Isha Holt MD  Preanesthetic Checklist  Completed: patient identified, risks, benefits, and alternatives discussed, timeout performed, consent obtained, at patient's request, airway assessed, oxygen available, suction available, emergency drugs available and hand hygiene performed  Procedure  Patient position: sitting  Prep: ChloraPrep  Patient monitoring: continuous pulse oximetry, heart rate and blood pressure  Approach: midline  Location: L3-L4  Injection technique: TOM air and TOM saline  Number of Attempts:1  Needle  Needle type: Tuohy   Needle gauge: 17 G     : TOM at 4 cm. Catheter at 9 cm at skin.  Assessment  Sensory level:  No complications      Additional Notes:    Patient requests labor epidural. Chart reviewed, including labs. Reviewed options and risks with the patient, including but not limited to: bleeding, infection, damage to tissues under the skin (nerves, muscles, blood vessels), hypotension, headache, and epidural failure. Questions answered, consent signed. Patient agrees to elective labor epidural.     Hands washed, sterile technique used, including scrub hat, mask, and sterile gloves.    Aspiration negative. Test dose negative.    Loading dose with bupivacaine 0.125% 10 cc - in divided doses.      Isha Holt MD  Staff Anesthesiologist  [facility]

## 2021-06-21 NOTE — PROGRESS NOTES
23 y.o.  at 34 weeks and 6 days gestation. She notes good fetal movement. She is feeling much better since last week. She is unsure what the pain was from, but she is wondering if the baby was going through a growth spurt. Her pain resolved two days after she was evaluated in L&D. She endorses increased nausea. This lasts for about 15 minutes and then resolves, but this has been worsening. She is having worsening tachycardic episodes. She feels this is occurring mostly at work and is interfering with work. She has been working quite a bit and feels her boss is pushing how much she can work. She has noticed an association with a nervous feeling. She feels she has nothing to be nervous about and that she does not specifically feel anxious. She is taking 50 mg sertraline daily and reports no adverse side effects. 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 She is wondering if she needs to be worried about her cold sores. She had a cold sore last week. She does not have any other questions or concerns at this time.    JESSICA Hook-Up 18 reviewed, no evidence of rhythm disturbance. We increase her sertraline to 100 mg daily. Work restrictions given, okay to work 8 hours per day. Will start prophylactic treatment with valacyclovir for cold sores. GBS will be done at next visit. Follow up in 2 weeks.    Rachel LEAVITT, am scribing for and in the presence of, Dr. Osborne.    IDr. Osborne, personally performed the services described in this documentation, as scribed by Rachel Gage in my presence, and it is both accurate and complete.

## 2021-06-23 NOTE — PROGRESS NOTES
Assessment/Plan:    Hannah Severino is a 23 y.o. female A POS  here for postpartum exam at 10 weeks postpartum. The delivery was at 40 weeks 0 days gestation. Outcome: spontaneous vaginal delivery. Delivery was uncomplicated.     Breastfeeding: No  Contraception: IUD, Mirena  Most recent PAP: 18, normal  Plan: Routine cares.    1. Postpartum care and examination  - Beta-hCG, Quantitative  - Hemoglobin    Return in about 2 weeks (around 2019), or IUD insertion.    Subjective:   Hannah Severino is a 23 y.o.. female who presents for a postpartum visit. She is 10 weeks postpartum following a spontaneous vaginal delivery. I have fully reviewed the prenatal and intrapartum course. The delivery was at 40 weeks 0 days gestation. Outcome: spontaneous vaginal delivery. Postpartum course has been uncomplicated. Baby's course has been uncomplicated. Baby is feeding by bottle - Similac Advance.  Currently bleeding  no bleeding. She has not had a menstrual period yet. Bowel function is normal. Bladder function is normal. Patient has resumed intercourse. Contraception method is IUD. Postpartum depression screening score: no concerns    Depression: She continues on sertraline 50 mg daily and reports no adverse side effects. She did have a short period of time after delivery during which she felt increased sadness at home and did not want to engage with her baby unless the baby cried. She feels that since her return to work, she has been much happier and more engaged with her daughter.     The following portions of the patient's history were reviewed and updated as appropriate: allergies, current medications and problem list    Review of Systems  General:  Denies problem  Eyes: Denies problem  Ears/Nose/Throat: Denies problem  Cardiovascular: Denies problem  Respiratory:  Denies problem  Gastrointestinal:  Denies problem, Genitourinary: Denies problem  Musculoskeletal:  Denies problem  Skin: Denies  problem  Neurologic: Denies problem  Psychiatric: Denies problem  Endocrine: Denies problem  Heme/Lymphatic: Denies problem   Allergic/Immunologic: Denies problem      Objective:      VITALS:  Vitals:    01/31/19 1102   BP: 98/60   Pulse: 100   SpO2: 99%   Weight: 118 lb 3.2 oz (53.6 kg)     Wt Readings from Last 3 Encounters:   01/31/19 118 lb 3.2 oz (53.6 kg)   11/19/18 147 lb (66.7 kg)   11/19/18 147 lb (66.7 kg)     Body mass index is 21.62 kg/m .     PHYSICAL EXAM:  General Appearance: Alert, cooperative, no distress, appears stated age  Head: Normocephalic, without obvious abnormality, atraumatic  Lungs: Clear to auscultation bilaterally, respirations unlabored  Heart: Regular rate and rhythm, S1 and S2 normal, no murmur, rub, or gallop  Abdomen: Soft, non-tender, bowel sounds active all four quadrants,  no masses, no organomegaly  Pelvic: Not examined  Neurologic: Normal      ADDITIONAL HISTORY SUMMARIZED (2): None.  DECISION TO OBTAIN EXTRA INFORMATION (1): None.   RADIOLOGY TESTS (1): None.  LABS (1): Labs done today.  MEDICINE TESTS (1): None.  INDEPENDENT REVIEW (2 each): None.     The visit lasted a total of 12 minutes face to face with the patient. Over 50% of the time was spent counseling and educating the patient about postpartum care.    IRachel, am scribing for and in the presence of, Dr. Osborne.    I, Dr. Osborne, personally performed the services described in this documentation, as scribed by Rachel Gage in my presence, and it is both accurate and complete.    Total Data Points: 1

## 2021-06-24 NOTE — PROGRESS NOTES
ASSESSMENT/PLAN:  1. Encounter for routine checking of intrauterine contraceptive device (IUD)  23-year-old female here for 2-week checkup of the IUD strings.  The strings are in place.  She has had some intermittent heavy bleeding and cramping associated with it.  She states that it seems to be improving at this point in time.  Strings are visible and exam is normal.  We did do GC and chlamydia screening.  Recommend she follow her symptoms and if they are worsening or if not improved over the next 2 weeks, would recommend an ultrasound and follow-up.  Otherwise recheck in 1 year.      Patient Instructions   Let me know if your bleeding does not go away or if you develop any pain or discomfort.    Periodically check that the strings are in place.    No orders of the defined types were placed in this encounter.    There are no discontinued medications.    Return in about 1 year (around 2/26/2020) for annual physical, or sooner as needed.    CHIEF COMPLAINT;  Chief Complaint   Patient presents with     IUD check     placed two weeks ago today, 2 day's after getting has been having heavy bleeding and cramping the past week       HISTORY OF PRESENT ILLNESS:  Hannah is a 23 y.o. female presenting to the clinic today for IUD check. She had the Mirena IUD inserted without complication 2/12/19. She started having heavy bleeding 3 days after insertion. She was soaking a panty liner. She started using thicker pads and tampons. She feels the bleeding is starting to improve. She has been having more intense cramping in the last week. This is quite severe for her at times. She is having more left-sided abdominal cramping that she describes as a sharp pinching feeling. She has been having increased bowel movements. She denies fevers. She would like to keep the IUD in.     REVIEW OF SYSTEMS:  All other systems are negative.    PFSH:  She has returned to work.    TOBACCO USE:  Social History     Tobacco Use   Smoking Status Current  "Every Day Smoker   Smokeless Tobacco Never Used       VITALS:  Vitals:    02/26/19 1549   BP: 98/66   Pulse: 64   Resp: 20   Temp: 98.3  F (36.8  C)   TempSrc: Oral   Weight: 114 lb (51.7 kg)   Height: 5' 2\" (1.575 m)     Wt Readings from Last 3 Encounters:   02/26/19 114 lb (51.7 kg)   02/12/19 118 lb (53.5 kg)   01/31/19 118 lb 3.2 oz (53.6 kg)     Body mass index is 20.85 kg/m .    PHYSICAL EXAM:  GENERAL APPEARANCE: Alert, cooperative, no distress, appears stated age  HEAD: Normocephalic, without obvious abnormality, atraumatic  PELVIC: Normally developed genitalia with no external lesions or eruptions. Vagina and cervix show no lesions. No cystocele, No rectocele. Uterus normal.  No adnexal mass or tenderness. IUD strings visible.  NEUROLOGIC: CNII-XII intact.     RECENT RESULTS  No results found for this or any previous visit (from the past 48 hour(s)).    ADDITIONAL HISTORY SUMMARIZED (2): None.  DECISION TO OBTAIN EXTRA INFORMATION (1): None.  RADIOLOGY TESTS (1): None.  LABS (1): Labs done today.  MEDICINE TESTS (1): None.  INDEPENDENT REVIEW (2 each): None.    The visit lasted a total of 10 minutes face to face with the patient. Over 50% of the time was spent counseling and educating the patient about IUD.    IRachel, am scribing for and in the presence of, Dr. Osborne.    I, Dr. Osborne, personally performed the services described in this documentation, as scribed by Rachel Gage in my presence, and it is both accurate and complete.    Dragon dictation was used for this note.  Speech recognition errors are a possibility.    MEDICATIONS:  Current Outpatient Medications   Medication Sig Dispense Refill     ibuprofen (ADVIL) 100 MG chewable tablet Chew 6-8 tablets (600-800 mg total) every 8 (eight) hours as needed for pain. 100 tablet 0     nystatin (MYCOSTATIN) cream KATIE EXT AA BID FOR UP TO 1 WK  2     sertraline (ZOLOFT) 20 mg/mL concentrated solution Take 2.5 mL (50 mg total) by mouth at " bedtime Pt reports taking only 50 mg at home. Was not taking 100 mg at home.. 75 mL 0     valACYclovir (VALTREX) 500 MG tablet   2     No current facility-administered medications for this visit.        Total data points: 1

## 2021-06-24 NOTE — PROGRESS NOTES
Insertion of IUD  Date/Time: 2/12/2019 2:31 PM  Performed by: Doreen Osborne MD  Authorized by: Doreen Osborne MD   Consent: Verbal consent obtained. Written consent obtained.  Risks and benefits: risks, benefits and alternatives were discussed  Consent given by: patient  Required items: required blood products, implants, devices, and special equipment available  Preparation: Patient was prepped and draped in the usual sterile fashion.  Patient tolerance: Patient tolerated the procedure well with no immediate complications      ASSESSMENT/PLAN:  Cervix exposed and cleansed using betadine.  Cervix grasped using tenaculum.  Uterus sounded to 7 cm.   Minimal bleeding, controlled with Monsel's solution.  Mirena IUD inserted without complication, string length 2 inches.   Follow up in 1-2 weeks for string check.    IUD: Mirena  Lot #: [id number]  Exp: 06/2021    ADDITIONAL HISTORY SUMMARIZED (2): None.  DECISION TO OBTAIN EXTRA INFORMATION (1): None.   RADIOLOGY TESTS (1): None.  LABS (1): UPT ordered, negative.  MEDICINE TESTS (1): None.  INDEPENDENT REVIEW (2 each): None.     The visit lasted a total of 19 minutes face to face with the patient. Over 50% of the time was spent counseling and educating the patient about IUD insertion and care.    IRachel, am scribing for and in the presence of, Dr. Osborne.    I, Dr. Osborne, personally performed the services described in this documentation, as scribed by Rachel Gage in my presence, and it is both accurate and complete.    Dragon dictation was used for this note.  Speech recognition errors are a possibility.    Total Data Points: 1

## 2021-06-24 NOTE — PATIENT INSTRUCTIONS - HE
Let me know if your bleeding does not go away or if you develop any pain or discomfort.    Periodically check that the strings are in place.

## 2021-06-24 NOTE — PATIENT INSTRUCTIONS - HE
Avoid intercourse for 4-5 days.    You may experience some cramping for the next few days.  - You can take ibuprofen as needed    You may have some more bleeding during the first week.    You can expect for the first couple of months that you will have some spotting or irregular bleeding - you should not have too much heavy bleeding.    I recommend periodically checking for the IUD strings.

## 2021-07-03 NOTE — ADDENDUM NOTE
Addendum Note by Jacquie Cerna LPN at 8/6/2018  4:31 PM     Author: Jacquie Cerna LPN Service: -- Author Type: Licensed Nurse    Filed: 8/6/2018  4:31 PM Encounter Date: 8/6/2018 Status: Signed    : Jacquie Cerna LPN (Licensed Nurse)    Addended by: JACQUIE CERNA on: 8/6/2018 04:31 PM        Modules accepted: Orders

## 2021-10-12 ENCOUNTER — HOSPITAL ENCOUNTER (EMERGENCY)
Facility: HOSPITAL | Age: 26
Discharge: HOME | End: 2021-10-12
Attending: EMERGENCY MEDICINE | Admitting: EMERGENCY MEDICINE
Payer: COMMERCIAL

## 2021-10-12 VITALS
TEMPERATURE: 98.4 F | OXYGEN SATURATION: 99 % | DIASTOLIC BLOOD PRESSURE: 85 MMHG | RESPIRATION RATE: 18 BRPM | HEART RATE: 67 BPM | SYSTOLIC BLOOD PRESSURE: 130 MMHG | BODY MASS INDEX: 20.24 KG/M2 | HEIGHT: 62 IN | WEIGHT: 110 LBS

## 2021-10-12 DIAGNOSIS — K08.89 PAIN, DENTAL: ICD-10-CM

## 2021-10-12 PROCEDURE — 99284 EMERGENCY DEPT VISIT MOD MDM: CPT

## 2021-10-12 RX ORDER — HYDROCODONE BITARTRATE AND ACETAMINOPHEN 5; 325 MG/1; MG/1
1 TABLET ORAL EVERY 6 HOURS PRN
Qty: 6 TABLET | Refills: 0 | Status: SHIPPED | OUTPATIENT
Start: 2021-10-12 | End: 2021-10-15

## 2021-10-12 RX ORDER — AMOXICILLIN 400 MG/5ML
500 POWDER, FOR SUSPENSION ORAL 3 TIMES DAILY
Qty: 189 ML | Refills: 0 | Status: SHIPPED | OUTPATIENT
Start: 2021-10-12 | End: 2021-10-22

## 2021-10-12 ASSESSMENT — ENCOUNTER SYMPTOMS
VOMITING: 0
FEVER: 0
TROUBLE SWALLOWING: 0
CHILLS: 0
FACIAL SWELLING: 0
NAUSEA: 0

## 2021-10-12 ASSESSMENT — MIFFLIN-ST. JEOR: SCORE: 1197.21

## 2021-10-12 NOTE — ED PROVIDER NOTES
EMERGENCY DEPARTMENT ENCOUNTER      NAME: Hannah Severino  AGE: 26 year old female  YOB: 1995  MRN: [id number]  EVALUATION DATE & TIME: 10/12/2021  6:38 PM    PCP: Doreen Osborne    ED PROVIDER: Jessy Daigle PA-C      Chief Complaint   Patient presents with     Dental Pain         FINAL IMPRESSION:  1. Pain, dental          ED COURSE & MEDICAL DECISION MAKING:    Pertinent Labs & Imaging studies reviewed. (See chart for details)    26 year old female presents to the Emergency Department for evaluation of dental pain.    Physical exam is remarkable for a generally well-appearing female who is in no acute distress.  She has generally poor dentition with significant decay in the left lower molars.  Tenderness to percussion over several molars.  No obvious gingival abscess.  No tenderness or swelling on the floor of the mouth.  Patient is tolerating her oral secretions without difficulty.  No adenopathy, left TM unremarkable appearing.  Vital signs are stable and she is afebrile.    I do not think any emergent labs or imaging are indicated at this time.  The patient is overall well-appearing and denies any systemic symptoms of illness.  There is no swelling on the floor of the mouth concerning for Ash's angina.  Tonsils are unremarkable appearing and there is no evidence of airway compromise.  I suspect her symptoms are secondary to dental caries with associated infection.  Patient will be provided with prescription for amoxicillin, she requested liquid as she has difficulty swallowing pills.  Recommend Tylenol and ibuprofen at home for pain, short course of Vicodin also provided for breakthrough pain.   reviewed with no concerning narcotic prescriptions.  Patient was provided with list of low-cost dental clinics, advised to follow-up with dental as soon as possible.  Recommend return to the ER if she develops any new or worsening symptoms like severe pain, fever, persistent vomiting,  difficulty breathing, facial swelling, or any other concerning symptoms.  The patient is amenable with this treatment plan and verbalized her understanding.    ED Course   6:57 PM Performed my initial history and physical exam. Discussed workup in the emergency department, management of symptoms, and likely disposition. I discussed the plan for discharge with the patient, and patient is agreeable. We discussed supportive cares at home and reasons for return to the ER including new or worsening symptoms - all questions and concerns addressed. Patient to be discharged by RN.    At the conclusion of the encounter I discussed the results of all of the tests and the disposition. The questions were answered. The patient or family acknowledged understanding and was agreeable with the care plan.     Voice recognition software was used in the creation of this note. Any grammatical or nonsensical errors are due to inherent errors with the software and are not the intention of the writer.     MEDICATIONS GIVEN IN THE EMERGENCY:  Medications - No data to display    NEW PRESCRIPTIONS STARTED AT TODAY'S ER VISIT  Discharge Medication List as of 10/12/2021  7:20 PM      START taking these medications    Details   amoxicillin (AMOXIL) 400 MG/5ML suspension Take 6.3 mLs (500 mg) by mouth 3 times daily for 10 days, Disp-189 mL, R-0, Local Print      HYDROcodone-acetaminophen (NORCO) 5-325 MG tablet Take 1 tablet by mouth every 6 hours as needed for severe pain, Disp-6 tablet, R-0, Local Print                  =================================================================    HPI    Patient information was obtained from: Patient    Use of Intrepreter: N/A         Hannah Severino is a 26 year old female who presents to the ED for evaluation of tooth pain.    The patient states that she has been intermittently having left lower molar tooth pain for approximately the last year.  Over the last 2 days, her pain became constant and  more severe.  She describes it as an aching pain with occasional sharp pains radiating to her left eardrum and down the left side of her neck.  She has been taking Tylenol for pain with minimal improvement.  She denies any nausea, vomiting, difficulty breathing, excessive drooling, difficulty swallowing, fevers, or chills.  She states she has not seen a dentist for many years as she does not have dental insurance.      REVIEW OF SYSTEMS   Review of Systems   Constitutional: Negative for chills and fever.   HENT: Positive for dental problem and ear pain. Negative for facial swelling and trouble swallowing.    Gastrointestinal: Negative for nausea and vomiting.         All other systems reviewed and are negative unless noted in HPI.      PAST MEDICAL HISTORY:  Past Medical History:   Diagnosis Date     NO ACTIVE PROBLEMS        PAST SURGICAL HISTORY:  Past Surgical History:   Procedure Laterality Date     DILATION AND CURETTAGE SUCTION, TREAT INCOMPLETE  N/A 2016    Procedure: DILATION AND CURETTAGE SUCTION, TREAT INCOMPLETE ;  Surgeon: Violette Patel MD;  Location: [facility] OR     no previous surgery         CURRENT MEDICATIONS:    amoxicillin (AMOXIL) 400 MG/5ML suspension  HYDROcodone-acetaminophen (NORCO) 5-325 MG tablet  sertraline (ZOLOFT) 20 MG/ML (HIGH CONC) solution  sertraline (ZOLOFT) 50 MG tablet        ALLERGIES:  No Known Allergies    FAMILY HISTORY:  Family History   Problem Relation Age of Onset     Diabetes Maternal Grandfather        SOCIAL HISTORY:   Social History     Socioeconomic History     Marital status: Single     Spouse name: Not on file     Number of children: Not on file     Years of education: Not on file     Highest education level: Not on file   Occupational History     Not on file   Tobacco Use     Smoking status: Current Every Day Smoker     Packs/day: 0.75     Years: 5.00     Pack years: 3.75     Types: Cigarettes     Smokeless tobacco: Never Used   Substance and  "Sexual Activity     Alcohol use: No     Alcohol/week: 0.0 standard drinks     Drug use: No     Sexual activity: Yes     Partners: Male   Other Topics Concern     Parent/sibling w/ CABG, MI or angioplasty before 65F 55M? Not Asked   Social History Narrative    :  Hannah lives with her boyfriend, no living children--daughter .  She works [employer].       Social Determinants of Health     Financial Resource Strain:      Difficulty of Paying Living Expenses:    Food Insecurity:      Worried About Running Out of Food in the Last Year:      Ran Out of Food in the Last Year:    Transportation Needs:      Lack of Transportation (Medical):      Lack of Transportation (Non-Medical):    Physical Activity:      Days of Exercise per Week:      Minutes of Exercise per Session:    Stress:      Feeling of Stress :    Social Connections:      Frequency of Communication with Friends and Family:      Frequency of Social Gatherings with Friends and Family:      Attends Druze Services:      Active Member of Clubs or Organizations:      Attends Club or Organization Meetings:      Marital Status:    Intimate Partner Violence:      Fear of Current or Ex-Partner:      Emotionally Abused:      Physically Abused:      Sexually Abused:        VITALS:  Patient Vitals for the past 24 hrs:   BP Temp Temp src Pulse Resp SpO2 Height Weight   10/12/21 1919 130/85 98.4  F (36.9  C) Oral -- -- -- -- --   10/12/21 1843 -- -- Oral 67 18 99 % 1.575 m (5' 2\") 49.9 kg (110 lb)       PHYSICAL EXAM    VITAL SIGNS: /85   Pulse 67   Temp 98.4  F (36.9  C) (Oral)   Resp 18   Ht 1.575 m (5' 2\")   Wt 49.9 kg (110 lb)   SpO2 99%   BMI 20.12 kg/m    General Appearance: Alert, cooperative, normal speech and facial symmetry, appears stated age, the patient does not appear in distress  Head:  Normocephalic, without obvious abnormality, atraumatic  Eyes: Conjunctiva/corneas clear, EOM's intact, no nystagmus, PERRL  ENT:  Generally " poor dentition with significant decay in the left lower molars.  Tenderness to percussion over several molars.  No obvious gingival abscess.  No tenderness or swelling on the floor of the mouth.  Patient is tolerating her oral secretions without difficulty.  No adenopathy, left TM unremarkable appearing no pharyngeal inflammation, no dysphonia or difficulty swallowing, membranes are moist without pallor   Neuro: Patient is awake, alert, and responsive to voice. No gross motor weaknesses or sensory loss; moves all extremities.     LAB:  All pertinent labs reviewed and interpreted.  Labs Ordered and Resulted from Time of ED Arrival Up to the Time of Departure from the ED - No data to display    RADIOLOGY:  Reviewed all pertinent imaging. Please see official radiology report.  No orders to display         Jessy Daigle PA-C  Emergency Medicine  [facility] EMERGENCY DEPARTMENT  [facility] [facility] [id number] [phone]  Dept: [phone]       Jessy Daigle PA-C  10/12/21 3155

## 2021-10-13 NOTE — DISCHARGE INSTRUCTIONS
You were seen in the emergency department today for evaluation of dental pain.  You likely have an infection, I will prescribe you antibiotics to treat this.  Please take the entire course even if you are feeling better.    You may take Tylenol and ibuprofen for pain/fever, do not exceed 4000 mg of Tylenol per day or 3200 mg ofibuprofen per day.  I will prescribe you a small amount of Vicodin for breakthrough pain-This is a narcotic pain medication that might be habit forming so only take when necessary. Do not drink alcohol,drive, or operate machinery while taking this medication.  You may also want to try some clove oil.    Below is a list of low-cost dental clinics, please schedule an appointment as soon as possible with 1 of these providers as you may need the tooth pulled or filled: [email or web address]    Return to the ER if you develop any new or worsening symptoms like severe pain, fever, persistent vomiting, difficulty swallowing your fluids, facial swelling, or any other symptoms that concern you.

## 2022-03-04 NOTE — PROGRESS NOTES
23 y.o.  at 32 weeks and 6 days gestation. She notes good fetal movement, especially today. She has been having decreased appetite and oral intake. She endorses shortness of breath and increased heart racing. She denies chest pain or redness in her calf. This happens quite a bit at work. She is wondering if stressful environments trigger this. she tries to drink a lot of water at work. She tries to sit down when her heart races. Sometimes she experiences stabbing pains in her pelvis. These do not last for a long time. She has also noticed some twitching when the baby kicks. Her mood has been stable. She is planning to live with her mother when the baby is born. Her mother will be supporting her during labor. She does not have any questions or concerns at this time.  She did receive the tetanus immunization today. Discussed drinking plenty of fluids and eating small, frequent meals with plenty of protein. Advised increasing fluid intake with decreased fetal movement or cramping. Follow up in 2 weeks.    IRachel, am scribing for and in the presence of, Dr. Osborne.    I, Dr. Osborne, personally performed the services described in this documentation, as scribed by Rachel Gage in my presence, and it is both accurate and complete.  
Out of breath  
25

## 2022-07-05 DIAGNOSIS — F43.25 ADJUSTMENT DISORDER WITH MIXED DISTURBANCE OF EMOTIONS AND CONDUCT: ICD-10-CM

## 2022-07-06 NOTE — TELEPHONE ENCOUNTER
"Routing refill request to provider for review/approval because:  Patient needs to be seen because it has been more than 1 year since last office visit.    Last Written Prescription Date:  09/09/21  Last Fill Quantity: 30,  # refills: 4   Last office visit provider:   02/26/2019    Requested Prescriptions   Pending Prescriptions Disp Refills     sertraline (ZOLOFT) 50 MG tablet [Pharmacy Med Name: SERTRALINE 50MG TABLETS] 30 tablet 4     Sig: TAKE 1 TABLET(50 MG) BY MOUTH DAILY       SSRIs Protocol Failed - 7/5/2022  3:06 PM        Failed - Recent (12 mo) or future (30 days) visit within the authorizing provider's specialty     Patient has had an office visit with the authorizing provider or a provider within the authorizing providers department within the previous 12 mos or has a future within next 30 days. See \"Patient Info\" tab in inbasket, or \"Choose Columns\" in Meds & Orders section of the refill encounter.              Passed - Medication is active on med list        Passed - Patient is age 18 or older        Passed - No active pregnancy on record        Passed - No positive pregnancy test in last 12 months             Michell Peterson RN 07/06/22 4:06 PM  "

## 2022-07-09 ENCOUNTER — HEALTH MAINTENANCE LETTER (OUTPATIENT)
Age: 27
End: 2022-07-09

## 2022-09-04 ENCOUNTER — HEALTH MAINTENANCE LETTER (OUTPATIENT)
Age: 27
End: 2022-09-04

## 2022-12-12 ENCOUNTER — MYC REFILL (OUTPATIENT)
Dept: FAMILY MEDICINE | Facility: CLINIC | Age: 27
End: 2022-12-12

## 2022-12-12 DIAGNOSIS — F43.25 ADJUSTMENT DISORDER WITH MIXED DISTURBANCE OF EMOTIONS AND CONDUCT: ICD-10-CM

## 2022-12-19 ENCOUNTER — OFFICE VISIT (OUTPATIENT)
Dept: FAMILY MEDICINE | Facility: CLINIC | Age: 27
End: 2022-12-19
Payer: COMMERCIAL

## 2022-12-19 VITALS
OXYGEN SATURATION: 97 % | BODY MASS INDEX: 20.63 KG/M2 | WEIGHT: 112.8 LBS | RESPIRATION RATE: 15 BRPM | TEMPERATURE: 98 F | DIASTOLIC BLOOD PRESSURE: 66 MMHG | HEART RATE: 67 BPM | SYSTOLIC BLOOD PRESSURE: 107 MMHG

## 2022-12-19 DIAGNOSIS — F33.1 MODERATE EPISODE OF RECURRENT MAJOR DEPRESSIVE DISORDER (H): ICD-10-CM

## 2022-12-19 DIAGNOSIS — F43.25 ADJUSTMENT DISORDER WITH MIXED DISTURBANCE OF EMOTIONS AND CONDUCT: Primary | ICD-10-CM

## 2022-12-19 DIAGNOSIS — R41.840 DIFFICULTY CONCENTRATING: ICD-10-CM

## 2022-12-19 DIAGNOSIS — F43.25 ADJUSTMENT DISORDER WITH MIXED DISTURBANCE OF EMOTIONS AND CONDUCT: ICD-10-CM

## 2022-12-19 PROCEDURE — 99204 OFFICE O/P NEW MOD 45 MIN: CPT | Mod: 25 | Performed by: NURSE PRACTITIONER

## 2022-12-19 PROCEDURE — 90471 IMMUNIZATION ADMIN: CPT | Performed by: NURSE PRACTITIONER

## 2022-12-19 PROCEDURE — 90677 PCV20 VACCINE IM: CPT | Performed by: NURSE PRACTITIONER

## 2022-12-19 PROCEDURE — 90472 IMMUNIZATION ADMIN EACH ADD: CPT | Performed by: NURSE PRACTITIONER

## 2022-12-19 PROCEDURE — 90686 IIV4 VACC NO PRSV 0.5 ML IM: CPT | Performed by: NURSE PRACTITIONER

## 2022-12-19 ASSESSMENT — ANXIETY QUESTIONNAIRES
6. BECOMING EASILY ANNOYED OR IRRITABLE: NEARLY EVERY DAY
7. FEELING AFRAID AS IF SOMETHING AWFUL MIGHT HAPPEN: NEARLY EVERY DAY
GAD7 TOTAL SCORE: 19
5. BEING SO RESTLESS THAT IT IS HARD TO SIT STILL: NEARLY EVERY DAY
8. IF YOU CHECKED OFF ANY PROBLEMS, HOW DIFFICULT HAVE THESE MADE IT FOR YOU TO DO YOUR WORK, TAKE CARE OF THINGS AT HOME, OR GET ALONG WITH OTHER PEOPLE?: EXTREMELY DIFFICULT
IF YOU CHECKED OFF ANY PROBLEMS ON THIS QUESTIONNAIRE, HOW DIFFICULT HAVE THESE PROBLEMS MADE IT FOR YOU TO DO YOUR WORK, TAKE CARE OF THINGS AT HOME, OR GET ALONG WITH OTHER PEOPLE: EXTREMELY DIFFICULT
2. NOT BEING ABLE TO STOP OR CONTROL WORRYING: NEARLY EVERY DAY
4. TROUBLE RELAXING: MORE THAN HALF THE DAYS
GAD7 TOTAL SCORE: 19
GAD7 TOTAL SCORE: 19
1. FEELING NERVOUS, ANXIOUS, OR ON EDGE: NEARLY EVERY DAY
7. FEELING AFRAID AS IF SOMETHING AWFUL MIGHT HAPPEN: NEARLY EVERY DAY
3. WORRYING TOO MUCH ABOUT DIFFERENT THINGS: MORE THAN HALF THE DAYS

## 2022-12-19 ASSESSMENT — PATIENT HEALTH QUESTIONNAIRE - PHQ9
SUM OF ALL RESPONSES TO PHQ QUESTIONS 1-9: 16
10. IF YOU CHECKED OFF ANY PROBLEMS, HOW DIFFICULT HAVE THESE PROBLEMS MADE IT FOR YOU TO DO YOUR WORK, TAKE CARE OF THINGS AT HOME, OR GET ALONG WITH OTHER PEOPLE: EXTREMELY DIFFICULT
SUM OF ALL RESPONSES TO PHQ QUESTIONS 1-9: 16

## 2022-12-19 ASSESSMENT — PAIN SCALES - GENERAL: PAINLEVEL: NO PAIN (0)

## 2022-12-19 NOTE — PROGRESS NOTES
Assessment & Plan     Adjustment disorder with mixed disturbance of emotions and conduct  Moderate episode of recurrent major depressive disorder (H)  Reviewed history of major depressive disorder and adjustment disorder.  Will increase dose of sertraline to 100 mg.  Patient is concerned about underlying bipolar disorder and would be interested in a psychiatric referral for evaluation.  We will also place referral for psychological testing to rule out ADHD diagnosis.  She is advised to follow-up with myself or one of my partners in approximately 1 month.  - Adult Mental Health  Referral  - Adult Mental Health  Referral    Difficulty concentrating  As above, will put referral in for ADHD testing.  We will follow-up when results are received.  - Adult Mental Health  Referral       Nicotine/Tobacco Cessation:  She reports that she has been smoking cigarettes. She has a 3.75 pack-year smoking history. She has never used smokeless tobacco.  Nicotine/Tobacco Cessation Plan:   Information offered: Patient not interested at this time      Depression Screening Follow Up    PHQ 12/19/2022   PHQ-9 Total Score 16   Q9: Thoughts of better off dead/self-harm past 2 weeks Not at all     Last PHQ-9 12/19/2022   1.  Little interest or pleasure in doing things 3   2.  Feeling down, depressed, or hopeless 2   3.  Trouble falling or staying asleep, or sleeping too much 0   4.  Feeling tired or having little energy 3   5.  Poor appetite or overeating 3   6.  Feeling bad about yourself 1   7.  Trouble concentrating 1   8.  Moving slowly or restless 3   Q9: Thoughts of better off dead/self-harm past 2 weeks 0   PHQ-9 Total Score 16   Difficulty at work, home, or with people -       Follow Up Actions Taken  Crisis resource information provided in After Visit Summary  Mental Health Referral placed         No follow-ups on file.    DAPHNE Noble [facility]    Raymond Young  is a 27 year old, presenting for the following health issues:  Depression (Discuss depression action plan & test for adhd & bipolar disorder . )      HPI     Patient is here today for follow-up on depression and adjustment disorder and with concerns of possible ADHD.  She has been on sertraline for the last several years.  She had a stillborn several years ago which contributed to her depression.  She reports feeling better in regards to this lately however still struggles with symptoms of irritability and short tempered.  She is currently taking 50 mg of sertraline and has been on this dose for the last year and a half or so.  She denies any side effects from the medication.  She does wonder about possible bipolar disorder.  She does have manic tendencies and impulsivity.  She oftentimes will spend money quickly without much thought given.  She also struggles with concentration and focus issues.  She feels that this has been an issue for her always.  She struggled in school, dropped out at 1 point and switched multiple different schools.  She reports having a hard time learning without being incredibly hands-on.  She needs constant stimulation to stay focused.  She has a hard time completing tasks at work and at home.  She would be interested in psychological testing for ADHD evaluation.    Review of Systems   Review of Systems - pertinent positives noted in HPI, otherwise ROS is negative.        Objective    /66 (BP Location: Right arm, Patient Position: Sitting, Cuff Size: Adult Regular)   Pulse 67   Temp 98  F (36.7  C) (Oral)   Resp 15   Wt 51.2 kg (112 lb 12.8 oz)   SpO2 97%   BMI 20.63 kg/m    Body mass index is 20.63 kg/m .  Physical Exam     General Appearance:  Alert, cooperative, no distress, appears stated age   Lungs:   Clear to auscultation bilaterally, respirations unlabored.  No expiratory wheeze or inspiratory crackles noted.   Heart:  Regular rate and rhythm, S1, S2 normal, no murmur,  rub or gallop   Psychiatric:  Thought process and affect intact.  Patient answers questions appropriately.  Attention intact, no obvious distractibility or hyperactivity noted.  No thoughts of self-harm.

## 2022-12-21 ENCOUNTER — MYC MEDICAL ADVICE (OUTPATIENT)
Dept: FAMILY MEDICINE | Facility: CLINIC | Age: 27
End: 2022-12-21

## 2022-12-21 NOTE — TELEPHONE ENCOUNTER
"Former patient of Osborne & has not established care with another provider.  Please assign refill request to covering provider per clinic standard process.    Last Written Prescription Date:  12/12/22  Last Fill Quantity: 30,  # refills: 0   Last office visit provider:  12/19/22     Requested Prescriptions   Pending Prescriptions Disp Refills     sertraline (ZOLOFT) 50 MG tablet [Pharmacy Med Name: SERTRALINE 50MG TABLETS] 30 tablet 0     Sig: TAKE 1 TABLET(50 MG) BY MOUTH DAILY       SSRIs Protocol Passed - 12/19/2022  6:02 PM        Passed - Recent (12 mo) or future (30 days) visit within the authorizing provider's specialty     Patient has had an office visit with the authorizing provider or a provider within the authorizing providers department within the previous 12 mos or has a future within next 30 days. See \"Patient Info\" tab in inbasket, or \"Choose Columns\" in Meds & Orders section of the refill encounter.              Passed - Medication is active on med list        Passed - Patient is age 18 or older        Passed - No active pregnancy on record        Passed - No positive pregnancy test in last 12 months             Starla Bray, RN 12/20/22 7:09 PM  "

## 2023-01-02 DIAGNOSIS — F43.25 ADJUSTMENT DISORDER WITH MIXED DISTURBANCE OF EMOTIONS AND CONDUCT: Primary | ICD-10-CM

## 2023-01-02 RX ORDER — SERTRALINE HYDROCHLORIDE 100 MG/1
100 TABLET, FILM COATED ORAL DAILY
Qty: 30 TABLET | Refills: 4 | Status: SHIPPED | OUTPATIENT
Start: 2023-01-02 | End: 2023-07-31

## 2023-03-18 ENCOUNTER — E-VISIT (OUTPATIENT)
Dept: FAMILY MEDICINE | Facility: CLINIC | Age: 28
End: 2023-03-18
Payer: COMMERCIAL

## 2023-03-18 DIAGNOSIS — F43.25 ADJUSTMENT DISORDER WITH MIXED DISTURBANCE OF EMOTIONS AND CONDUCT: Primary | ICD-10-CM

## 2023-03-18 PROCEDURE — 99421 OL DIG E/M SVC 5-10 MIN: CPT | Performed by: NURSE PRACTITIONER

## 2023-03-21 ENCOUNTER — MYC MEDICAL ADVICE (OUTPATIENT)
Dept: FAMILY MEDICINE | Facility: CLINIC | Age: 28
End: 2023-03-21
Payer: COMMERCIAL

## 2023-03-24 ENCOUNTER — HOSPITAL ENCOUNTER (EMERGENCY)
Facility: CLINIC | Age: 28
Discharge: HOME | End: 2023-03-24
Attending: EMERGENCY MEDICINE | Admitting: EMERGENCY MEDICINE
Payer: COMMERCIAL

## 2023-03-24 VITALS
OXYGEN SATURATION: 99 % | TEMPERATURE: 98.6 F | BODY MASS INDEX: 21.16 KG/M2 | HEIGHT: 62 IN | HEART RATE: 66 BPM | DIASTOLIC BLOOD PRESSURE: 67 MMHG | SYSTOLIC BLOOD PRESSURE: 109 MMHG | RESPIRATION RATE: 16 BRPM | WEIGHT: 115 LBS

## 2023-03-24 DIAGNOSIS — K04.7 DENTAL INFECTION: ICD-10-CM

## 2023-03-24 PROCEDURE — 99282 EMERGENCY DEPT VISIT SF MDM: CPT

## 2023-03-24 ASSESSMENT — ACTIVITIES OF DAILY LIVING (ADL): ADLS_ACUITY_SCORE: 35

## 2023-03-24 ASSESSMENT — ENCOUNTER SYMPTOMS: TROUBLE SWALLOWING: 1

## 2023-03-24 NOTE — DISCHARGE INSTRUCTIONS
As we discussed [facility] states they will see you on Monday.  Continue your Augmentin.  Between now and Monday if things get worse please return to an ER but be aware we do not have any oral surgeons therefore would recommend going to a ER that has oral surgery such as [facility], [facility]

## 2023-03-24 NOTE — ED TRIAGE NOTES
Diagnosed with a right lower tooth abscess 2 days ago and started on antibiotics.  Swelling is worse. Swelling right lower jaw and neck.
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
## 2023-04-14 ENCOUNTER — VIRTUAL VISIT (OUTPATIENT)
Dept: PSYCHOLOGY | Facility: CLINIC | Age: 28
End: 2023-04-14
Attending: NURSE PRACTITIONER
Payer: COMMERCIAL

## 2023-04-14 DIAGNOSIS — R41.840 DIFFICULTY CONCENTRATING: ICD-10-CM

## 2023-04-14 DIAGNOSIS — F41.1 GENERALIZED ANXIETY DISORDER: Primary | ICD-10-CM

## 2023-04-14 DIAGNOSIS — F33.1 MODERATE EPISODE OF RECURRENT MAJOR DEPRESSIVE DISORDER (H): ICD-10-CM

## 2023-04-14 DIAGNOSIS — F43.25 ADJUSTMENT DISORDER WITH MIXED DISTURBANCE OF EMOTIONS AND CONDUCT: ICD-10-CM

## 2023-04-14 PROCEDURE — 90791 PSYCH DIAGNOSTIC EVALUATION: CPT | Mod: VID | Performed by: PSYCHOLOGIST

## 2023-04-14 ASSESSMENT — ANXIETY QUESTIONNAIRES
IF YOU CHECKED OFF ANY PROBLEMS ON THIS QUESTIONNAIRE, HOW DIFFICULT HAVE THESE PROBLEMS MADE IT FOR YOU TO DO YOUR WORK, TAKE CARE OF THINGS AT HOME, OR GET ALONG WITH OTHER PEOPLE: VERY DIFFICULT
6. BECOMING EASILY ANNOYED OR IRRITABLE: NEARLY EVERY DAY
GAD7 TOTAL SCORE: 19
3. WORRYING TOO MUCH ABOUT DIFFERENT THINGS: NEARLY EVERY DAY
GAD7 TOTAL SCORE: 19
7. FEELING AFRAID AS IF SOMETHING AWFUL MIGHT HAPPEN: MORE THAN HALF THE DAYS
2. NOT BEING ABLE TO STOP OR CONTROL WORRYING: NEARLY EVERY DAY
1. FEELING NERVOUS, ANXIOUS, OR ON EDGE: NEARLY EVERY DAY
5. BEING SO RESTLESS THAT IT IS HARD TO SIT STILL: NEARLY EVERY DAY

## 2023-04-14 ASSESSMENT — COLUMBIA-SUICIDE SEVERITY RATING SCALE - C-SSRS
2. HAVE YOU ACTUALLY HAD ANY THOUGHTS OF KILLING YOURSELF?: NO
LETHALITY/MEDICAL DAMAGE CODE MOST RECENT POTENTIAL ATTEMPT: BEHAVIOR NOT LIKELY TO RESULT IN INJURY
TOTAL  NUMBER OF INTERRUPTED ATTEMPTS PAST 3 MONTHS: NO
TOTAL  NUMBER OF ACTUAL ATTEMPTS LIFETIME: 1
5. HAVE YOU STARTED TO WORK OUT OR WORKED OUT THE DETAILS OF HOW TO KILL YOURSELF? DO YOU INTEND TO CARRY OUT THIS PLAN?: NO
ATTEMPT LIFETIME: YES
LETHALITY/MEDICAL DAMAGE CODE MOST LETHAL POTENTIAL ATTEMPT: BEHAVIOR NOT LIKELY TO RESULT IN INJURY
4. HAVE YOU HAD THESE THOUGHTS AND HAD SOME INTENTION OF ACTING ON THEM?: YES
LETHALITY/MEDICAL DAMAGE CODE FIRST POTENTIAL ATTEMPT: BEHAVIOR NOT LIKELY TO RESULT IN INJURY
4. HAVE YOU HAD THESE THOUGHTS AND HAD SOME INTENTION OF ACTING ON THEM?: NO
TOTAL  NUMBER OF INTERRUPTED ATTEMPTS LIFETIME: YES
LETHALITY/MEDICAL DAMAGE CODE MOST LETHAL ACTUAL ATTEMPT: NO PHYSICAL DAMAGE OR VERY MINOR PHYSICAL DAMAGE
1. IN THE PAST MONTH, HAVE YOU WISHED YOU WERE DEAD OR WISHED YOU COULD GO TO SLEEP AND NOT WAKE UP?: NO
2. HAVE YOU ACTUALLY HAD ANY THOUGHTS OF KILLING YOURSELF?: YES
REASONS FOR IDEATION LIFETIME: MOSTLY TO END OR STOP THE PAIN (YOU COULDN'T GO ON LIVING WITH THE PAIN OR HOW YOU WERE FEELING)
1. HAVE YOU WISHED YOU WERE DEAD OR WISHED YOU COULD GO TO SLEEP AND NOT WAKE UP?: YES
LETHALITY/MEDICAL DAMAGE CODE FIRST ACTUAL ATTEMPT: NO PHYSICAL DAMAGE OR VERY MINOR PHYSICAL DAMAGE
3. HAVE YOU BEEN THINKING ABOUT HOW YOU MIGHT KILL YOURSELF?: YES
6. HAVE YOU EVER DONE ANYTHING, STARTED TO DO ANYTHING, OR PREPARED TO DO ANYTHING TO END YOUR LIFE?: NO
TOTAL  NUMBER OF INTERRUPTED ATTEMPTS LIFETIME: 1
ATTEMPT PAST THREE MONTHS: NO
TOTAL  NUMBER OF ABORTED OR SELF INTERRUPTED ATTEMPTS LIFETIME: NO
5. HAVE YOU STARTED TO WORK OUT OR WORKED OUT THE DETAILS OF HOW TO KILL YOURSELF? DO YOU INTEND TO CARRY OUT THIS PLAN?: YES

## 2023-04-14 ASSESSMENT — PATIENT HEALTH QUESTIONNAIRE - PHQ9
SUM OF ALL RESPONSES TO PHQ QUESTIONS 1-9: 19
5. POOR APPETITE OR OVEREATING: MORE THAN HALF THE DAYS

## 2023-04-14 NOTE — PROGRESS NOTES
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 Credentials:  Jackie Thomas, PhD, LP  April 14, 2023          "

## 2023-07-23 ENCOUNTER — HEALTH MAINTENANCE LETTER (OUTPATIENT)
Age: 28
End: 2023-07-23

## 2023-07-31 ENCOUNTER — OFFICE VISIT (OUTPATIENT)
Dept: FAMILY MEDICINE | Facility: CLINIC | Age: 28
End: 2023-07-31
Payer: COMMERCIAL

## 2023-07-31 VITALS
OXYGEN SATURATION: 100 % | HEIGHT: 62 IN | SYSTOLIC BLOOD PRESSURE: 90 MMHG | RESPIRATION RATE: 16 BRPM | DIASTOLIC BLOOD PRESSURE: 58 MMHG | BODY MASS INDEX: 20.32 KG/M2 | WEIGHT: 110.4 LBS | HEART RATE: 68 BPM

## 2023-07-31 DIAGNOSIS — L98.9 SKIN LESION: Primary | ICD-10-CM

## 2023-07-31 DIAGNOSIS — R41.840 INATTENTION: ICD-10-CM

## 2023-07-31 DIAGNOSIS — F33.41 RECURRENT MAJOR DEPRESSIVE DISORDER, IN PARTIAL REMISSION (H): ICD-10-CM

## 2023-07-31 DIAGNOSIS — F43.25 ADJUSTMENT DISORDER WITH MIXED DISTURBANCE OF EMOTIONS AND CONDUCT: ICD-10-CM

## 2023-07-31 PROBLEM — F33.9 MAJOR DEPRESSION, RECURRENT (H): Status: ACTIVE | Noted: 2023-07-31

## 2023-07-31 PROCEDURE — 99213 OFFICE O/P EST LOW 20 MIN: CPT

## 2023-07-31 RX ORDER — KETOCONAZOLE 20 MG/G
CREAM TOPICAL DAILY
Qty: 30 G | Refills: 0 | Status: SHIPPED | OUTPATIENT
Start: 2023-07-31 | End: 2023-08-14

## 2023-07-31 RX ORDER — IBUPROFEN 600 MG/1
600 TABLET, FILM COATED ORAL EVERY 6 HOURS PRN
COMMUNITY
Start: 2023-03-22

## 2023-07-31 RX ORDER — SERTRALINE HYDROCHLORIDE 100 MG/1
100 TABLET, FILM COATED ORAL DAILY
Qty: 90 TABLET | Refills: 3 | Status: SHIPPED | OUTPATIENT
Start: 2023-07-31 | End: 2024-09-02

## 2023-07-31 ASSESSMENT — PATIENT HEALTH QUESTIONNAIRE - PHQ9
10. IF YOU CHECKED OFF ANY PROBLEMS, HOW DIFFICULT HAVE THESE PROBLEMS MADE IT FOR YOU TO DO YOUR WORK, TAKE CARE OF THINGS AT HOME, OR GET ALONG WITH OTHER PEOPLE: VERY DIFFICULT
SUM OF ALL RESPONSES TO PHQ QUESTIONS 1-9: 11
SUM OF ALL RESPONSES TO PHQ QUESTIONS 1-9: 11

## 2023-07-31 NOTE — ASSESSMENT & PLAN NOTE
Will refer to mental health [facility] for psychological testing to obtain formal diagnosis of ADHD. We briefly reviewed medication management of symptoms, which patient is interested in. Additionally discussed supportive cares and the requirements behind controlled substance prescriptions. She will make an office visit after she has been formally evaluated and we can discuss next steps.

## 2023-07-31 NOTE — ASSESSMENT & PLAN NOTE
Stable symptoms. PHQ-9 is 11 today, but subjectively patient feels good about her mental health. Concurrent diagnoses of PTSD/anxiety. Has been well controlled on sertraline 100mg daily. Refill provided today.

## 2023-07-31 NOTE — PROGRESS NOTES
Assessment & Plan   Problem List Items Addressed This Visit          Musculoskeletal and Integumentary    Skin lesion - Primary     Appears fungal in nature. Will trial ketoconazole x10-14 days. Referral placed to dermatology if lesion does not resolve despite this.         Relevant Medications    ketoconazole (NIZORAL) 2 % external cream    Other Relevant Orders    Adult Dermatology Referral       Behavioral    Adjustment disorder with mixed disturbance of emotions and conduct    Relevant Medications    sertraline (ZOLOFT) 100 MG tablet    Major depression, recurrent (H)     Stable symptoms. PHQ-9 is 11 today, but subjectively patient feels good about her mental health. Concurrent diagnoses of PTSD/anxiety. Has been well controlled on sertraline 100mg daily. Refill provided today.         Relevant Medications    sertraline (ZOLOFT) 100 MG tablet       Other    Inattention     Will refer to mental [facility] for psychological testing to obtain formal diagnosis of ADHD. We briefly reviewed medication management of symptoms, which patient is interested in. Additionally discussed supportive cares and the requirements behind controlled substance prescriptions. She will make an office visit after she has been formally evaluated and we can discuss next steps.          Relevant Orders    Adult Mental [facility] Referral       Depression Screening Follow Up        7/31/2023    10:35 AM   PHQ   PHQ-9 Total Score 11   Q9: Thoughts of better off dead/self-harm past 2 weeks Not at all         7/31/2023    10:35 AM   Last PHQ-9   1.  Little interest or pleasure in doing things 1   2.  Feeling down, depressed, or hopeless 1   3.  Trouble falling or staying asleep, or sleeping too much 2   4.  Feeling tired or having little energy 1   5.  Poor appetite or overeating 2   6.  Feeling bad about yourself 1   7.  Trouble concentrating 1   8.  Moving slowly or restless 2   Q9: Thoughts of better off dead/self-harm past 2  weeks 0   PHQ-9 Total Score 11       Follow Up Actions Taken  Patient counseled, no additional follow up at this time.     DAPHNE Barnhart CNP  [facility] KINGA Young is a 28 year old, presenting for the following health issues:  Referral (Referral for ADHD and skin cancer screening)        7/31/2023    11:00 AM   Additional Questions   Roomed by Dmitriy Leong MA   Accompanied by Self         7/31/2023    11:00 AM   Patient Reported Additional Medications   Patient reports taking the following new medications None       History of poor performance in school.  Friends with ADHD feel as if she shares some of their symptoms  Difficulty with focus.  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  Currently working at [employer]; has struggled with maintaining jobs in the past.   Is interested in medication management. She has tried Adderall in the past and found it was very helpful.  Did pursue formal diagnostic testing, but reports that the provider did not complete the assessment and instead diagnosed her with PTSD and anxiety rather than complete the evaluation for ADHD.     Skin concerns -   Thinks she has some darker patches on her back that she thinks has gotten larger. No bleeding. Not history of peeling burns on the back. Denies any family history of skin cancer.     History of Present Illness       Reason for visit:  ADHD & cancer diagnosis referrals  Symptom onset:  More than a month  Symptom intensity:  Severe  Symptom progression:  Worsening  Had these symptoms before:  Yes  Has tried/received treatment for these symptoms:  Yes  Previous treatment was successful:  No    She eats 2-3 servings of fruits and vegetables daily.She consumes 5 sweetened beverage(s) daily.She exercises with enough effort to increase her heart rate 30 to 60 minutes per day.  She exercises with enough effort to increase her heart rate 3 or less days per  "week.   She is taking medications regularly.     Review of Systems         Objective    BP 90/58 (BP Location: Left arm, Patient Position: Sitting, Cuff Size: Adult Regular)   Pulse 68   Resp 16   Ht 1.575 m (5' 2\")   Wt 50.1 kg (110 lb 6.4 oz)   SpO2 100%   BMI 20.19 kg/m    Body mass index is 20.19 kg/m .    Physical Exam  Vitals and nursing note reviewed.   Constitutional:       General: She is not in acute distress.     Appearance: Normal appearance.   Cardiovascular:      Rate and Rhythm: Normal rate and regular rhythm.   Pulmonary:      Effort: Pulmonary effort is normal. No respiratory distress.   Skin:     Comments: Left upper back: [id number] circular, slightly raised lesion. Pink in color with well defined borders, consistent with a fungal infection. Two smaller satellite lesions also present.    Neurological:      Mental Status: She is alert.                    "

## 2023-07-31 NOTE — ASSESSMENT & PLAN NOTE
Appears fungal in nature. Will trial ketoconazole x10-14 days. Referral placed to dermatology if lesion does not resolve despite this.

## 2024-02-12 ENCOUNTER — TELEPHONE (OUTPATIENT)
Dept: FAMILY MEDICINE | Facility: CLINIC | Age: 29
End: 2024-02-12

## 2024-02-12 NOTE — TELEPHONE ENCOUNTER
Patient Quality Outreach    Patient is due for the following:   Cervical Cancer Screening - PAP Needed  Physical Preventive Adult Physical    Next Steps:   No follow up needed at this time.    Type of outreach:    Sent "[employer] message.      Questions for provider review:    None           Valeri Hummel MA

## 2024-03-12 ENCOUNTER — TELEPHONE (OUTPATIENT)
Dept: FAMILY MEDICINE | Facility: CLINIC | Age: 29
End: 2024-03-12
Payer: COMMERCIAL

## 2024-03-12 NOTE — TELEPHONE ENCOUNTER
Left message to call back for: Hannah  Information to relay to patient: Please notify patient that [facility] does not do IUD's. Note on upcoming 03/18/24 appointment mentions IUD swap? Thank you.

## 2024-03-13 NOTE — TELEPHONE ENCOUNTER
Left message to call back for: Patient  Information to relay to patient: Please relay CMA message below regarding upcoming appointment.

## 2024-09-02 ENCOUNTER — MYC REFILL (OUTPATIENT)
Dept: FAMILY MEDICINE | Facility: CLINIC | Age: 29
End: 2024-09-02

## 2024-09-02 DIAGNOSIS — F43.25 ADJUSTMENT DISORDER WITH MIXED DISTURBANCE OF EMOTIONS AND CONDUCT: ICD-10-CM

## 2024-09-04 RX ORDER — SERTRALINE HYDROCHLORIDE 100 MG/1
100 TABLET, FILM COATED ORAL DAILY
Qty: 30 TABLET | Refills: 1 | Status: SHIPPED | OUTPATIENT
Start: 2024-09-04

## 2024-09-05 NOTE — TELEPHONE ENCOUNTER
Left message to call back for: patient  Information to relay to patient: see provider message below, relay message and assist in scheduling

## 2024-09-09 NOTE — TELEPHONE ENCOUNTER
Left message to call back for: patient  Information to relay to patient: see provider message below and assist in scheduling

## 2024-09-10 ENCOUNTER — TELEPHONE (OUTPATIENT)
Dept: FAMILY MEDICINE | Facility: CLINIC | Age: 29
End: 2024-09-10

## 2024-09-10 NOTE — TELEPHONE ENCOUNTER
Patient Quality Outreach    Patient is due for the following:   Physical Preventive Adult Physical    Next Steps:   No follow up needed at this time.    Type of outreach:    Sent [employer] message.      Questions for provider review:    None           Valeri Hummel MA

## 2024-09-12 NOTE — TELEPHONE ENCOUNTER
Left message to call back for: patient  Information to relay to patient: see message from provider and assist in scheduling

## 2024-09-14 ENCOUNTER — HEALTH MAINTENANCE LETTER (OUTPATIENT)
Age: 29
End: 2024-09-14

## 2025-04-28 ENCOUNTER — TELEPHONE (OUTPATIENT)
Dept: FAMILY MEDICINE | Facility: CLINIC | Age: 30
End: 2025-04-28

## 2025-04-28 NOTE — TELEPHONE ENCOUNTER
Left message to call back for: Hannah  Information to relay to patient: Please notify patient that [facility] does not do IUD's and that she would be referred to GYN for that. Just a FYI for her.    Thank you.  Valeri HILL CMA

## 2025-04-30 ENCOUNTER — MYC MEDICAL ADVICE (OUTPATIENT)
Dept: FAMILY MEDICINE | Facility: CLINIC | Age: 30
End: 2025-04-30
Payer: COMMERCIAL

## 2025-09-04 ENCOUNTER — OFFICE VISIT (OUTPATIENT)
Dept: FAMILY MEDICINE | Facility: CLINIC | Age: 30
End: 2025-09-04
Payer: COMMERCIAL

## 2025-09-04 VITALS
BODY MASS INDEX: 18.11 KG/M2 | HEIGHT: 63 IN | TEMPERATURE: 98.6 F | DIASTOLIC BLOOD PRESSURE: 72 MMHG | SYSTOLIC BLOOD PRESSURE: 106 MMHG | RESPIRATION RATE: 16 BRPM | HEART RATE: 77 BPM | WEIGHT: 102.19 LBS | OXYGEN SATURATION: 99 %

## 2025-09-04 DIAGNOSIS — Z00.00 ROUTINE GENERAL MEDICAL EXAMINATION AT A HEALTH CARE FACILITY: Primary | ICD-10-CM

## 2025-09-04 DIAGNOSIS — R41.840 INATTENTION: ICD-10-CM

## 2025-09-04 DIAGNOSIS — F43.25 ADJUSTMENT DISORDER WITH MIXED DISTURBANCE OF EMOTIONS AND CONDUCT: ICD-10-CM

## 2025-09-04 DIAGNOSIS — F33.1 MODERATE EPISODE OF RECURRENT MAJOR DEPRESSIVE DISORDER (H): ICD-10-CM

## 2025-09-04 DIAGNOSIS — R63.4 WEIGHT LOSS: ICD-10-CM

## 2025-09-04 PROBLEM — L98.9 SKIN LESION: Status: RESOLVED | Noted: 2023-07-31 | Resolved: 2025-09-04

## 2025-09-04 RX ORDER — SERTRALINE HYDROCHLORIDE 100 MG/1
150 TABLET, FILM COATED ORAL DAILY
Qty: 135 TABLET | Refills: 3 | Status: SHIPPED | OUTPATIENT
Start: 2025-09-04

## 2025-09-04 SDOH — HEALTH STABILITY: PHYSICAL HEALTH: ON AVERAGE, HOW MANY DAYS PER WEEK DO YOU ENGAGE IN MODERATE TO STRENUOUS EXERCISE (LIKE A BRISK WALK)?: 7 DAYS

## 2025-09-04 SDOH — HEALTH STABILITY: PHYSICAL HEALTH: ON AVERAGE, HOW MANY MINUTES DO YOU ENGAGE IN EXERCISE AT THIS LEVEL?: 80 MIN

## 2025-09-04 ASSESSMENT — PATIENT HEALTH QUESTIONNAIRE - PHQ9
10. IF YOU CHECKED OFF ANY PROBLEMS, HOW DIFFICULT HAVE THESE PROBLEMS MADE IT FOR YOU TO DO YOUR WORK, TAKE CARE OF THINGS AT HOME, OR GET ALONG WITH OTHER PEOPLE: EXTREMELY DIFFICULT
SUM OF ALL RESPONSES TO PHQ QUESTIONS 1-9: 20
SUM OF ALL RESPONSES TO PHQ QUESTIONS 1-9: 20